# Patient Record
Sex: MALE | Race: WHITE | HISPANIC OR LATINO | URBAN - METROPOLITAN AREA
[De-identification: names, ages, dates, MRNs, and addresses within clinical notes are randomized per-mention and may not be internally consistent; named-entity substitution may affect disease eponyms.]

---

## 2018-09-24 ENCOUNTER — HISTORICAL (OUTPATIENT)
Dept: CARDIOLOGY | Facility: HOSPITAL | Age: 48
End: 2018-09-24

## 2018-11-29 ENCOUNTER — HISTORICAL (OUTPATIENT)
Dept: ADMINISTRATIVE | Facility: HOSPITAL | Age: 48
End: 2018-11-29

## 2018-11-29 ENCOUNTER — HISTORICAL (OUTPATIENT)
Dept: RADIOLOGY | Facility: HOSPITAL | Age: 48
End: 2018-11-29

## 2018-11-29 LAB
ABS NEUT (OLG): 3.7 X10(3)/MCL (ref 2.1–9.2)
ALBUMIN SERPL-MCNC: 4.3 GM/DL (ref 3.4–5)
ALBUMIN/GLOB SERPL: 1 RATIO (ref 1–2)
ALP SERPL-CCNC: 118 UNIT/L (ref 45–117)
ALT SERPL-CCNC: 34 UNIT/L (ref 12–78)
AST SERPL-CCNC: 21 UNIT/L (ref 15–37)
BASOPHILS # BLD AUTO: 0.01 X10(3)/MCL
BASOPHILS NFR BLD AUTO: 0 %
BILIRUB SERPL-MCNC: 0.2 MG/DL (ref 0.2–1)
BILIRUBIN DIRECT+TOT PNL SERPL-MCNC: <0.1 MG/DL
BILIRUBIN DIRECT+TOT PNL SERPL-MCNC: ABNORMAL MG/DL
BUN SERPL-MCNC: 17 MG/DL (ref 7–18)
CALCIUM SERPL-MCNC: 8.8 MG/DL (ref 8.5–10.1)
CHLORIDE SERPL-SCNC: 107 MMOL/L (ref 98–107)
CHOLEST SERPL-MCNC: 183 MG/DL
CHOLEST/HDLC SERPL: 3.8 {RATIO} (ref 0–5)
CO2 SERPL-SCNC: 29 MMOL/L (ref 21–32)
CREAT SERPL-MCNC: 0.9 MG/DL (ref 0.6–1.3)
EOSINOPHIL # BLD AUTO: 0.03 10*3/UL
EOSINOPHIL NFR BLD AUTO: 0 %
ERYTHROCYTE [DISTWIDTH] IN BLOOD BY AUTOMATED COUNT: 13.3 % (ref 11.5–14.5)
GLOBULIN SER-MCNC: 3.5 GM/ML (ref 2.3–3.5)
GLUCOSE SERPL-MCNC: 97 MG/DL (ref 74–106)
HAV IGM SERPL QL IA: NONREACTIVE
HBV CORE IGM SERPL QL IA: NONREACTIVE
HBV SURFACE AG SERPL QL IA: NEGATIVE
HCT VFR BLD AUTO: 44.2 % (ref 40–51)
HCV AB SERPL QL IA: NONREACTIVE
HDLC SERPL-MCNC: 48 MG/DL
HGB BLD-MCNC: 13.8 GM/DL (ref 13.5–17.5)
HIV 1+2 AB+HIV1 P24 AG SERPL QL IA: NONREACTIVE
IMM GRANULOCYTES # BLD AUTO: 0.01 10*3/UL
IMM GRANULOCYTES NFR BLD AUTO: 0 %
LDLC SERPL CALC-MCNC: 110 MG/DL (ref 0–130)
LYMPHOCYTES # BLD AUTO: 1.97 X10(3)/MCL
LYMPHOCYTES NFR BLD AUTO: 32 % (ref 13–40)
MCH RBC QN AUTO: 26.3 PG (ref 26–34)
MCHC RBC AUTO-ENTMCNC: 31.2 GM/DL (ref 31–37)
MCV RBC AUTO: 84.2 FL (ref 80–100)
MONOCYTES # BLD AUTO: 0.43 X10(3)/MCL
MONOCYTES NFR BLD AUTO: 7 % (ref 4–12)
NEUTROPHILS # BLD AUTO: 3.7 X10(3)/MCL
NEUTROPHILS NFR BLD AUTO: 60 X10(3)/MCL
PLATELET # BLD AUTO: 258 X10(3)/MCL (ref 130–400)
PMV BLD AUTO: 9.1 FL (ref 7.4–10.4)
POTASSIUM SERPL-SCNC: 4.4 MMOL/L (ref 3.5–5.1)
PROT SERPL-MCNC: 7.8 GM/DL (ref 6.4–8.2)
RBC # BLD AUTO: 5.25 X10(6)/MCL (ref 4.5–5.9)
SODIUM SERPL-SCNC: 141 MMOL/L (ref 136–145)
T PALLIDUM AB SER QL: NONREACTIVE
TRIGL SERPL-MCNC: 125 MG/DL
TSH SERPL-ACNC: 1.17 MIU/L (ref 0.36–3.74)
VLDLC SERPL CALC-MCNC: 25 MG/DL
WBC # SPEC AUTO: 6.2 X10(3)/MCL (ref 4.5–11)

## 2022-04-11 ENCOUNTER — HISTORICAL (OUTPATIENT)
Dept: ADMINISTRATIVE | Facility: HOSPITAL | Age: 52
End: 2022-04-11

## 2022-04-25 VITALS
BODY MASS INDEX: 29.48 KG/M2 | HEIGHT: 70 IN | SYSTOLIC BLOOD PRESSURE: 112 MMHG | DIASTOLIC BLOOD PRESSURE: 88 MMHG | WEIGHT: 205.94 LBS

## 2024-03-14 ENCOUNTER — LAB VISIT (OUTPATIENT)
Dept: LAB | Facility: HOSPITAL | Age: 54
End: 2024-03-14
Attending: NURSE PRACTITIONER

## 2024-03-14 ENCOUNTER — OFFICE VISIT (OUTPATIENT)
Dept: INTERNAL MEDICINE | Facility: CLINIC | Age: 54
End: 2024-03-14

## 2024-03-14 DIAGNOSIS — Z11.4 SCREENING FOR HIV (HUMAN IMMUNODEFICIENCY VIRUS): ICD-10-CM

## 2024-03-14 DIAGNOSIS — Z12.5 PROSTATE CANCER SCREENING: ICD-10-CM

## 2024-03-14 DIAGNOSIS — Z12.11 COLON CANCER SCREENING: ICD-10-CM

## 2024-03-14 DIAGNOSIS — Z11.3 SCREENING EXAMINATION FOR STD (SEXUALLY TRANSMITTED DISEASE): ICD-10-CM

## 2024-03-14 DIAGNOSIS — Z00.00 WELLNESS EXAMINATION: Primary | ICD-10-CM

## 2024-03-14 DIAGNOSIS — D17.1 LIPOMA OF BACK: ICD-10-CM

## 2024-03-14 LAB
HIV 1+2 AB+HIV1 P24 AG SERPL QL IA: NONREACTIVE
T PALLIDUM AB SER QL: NONREACTIVE

## 2024-03-14 PROCEDURE — 99386 PREV VISIT NEW AGE 40-64: CPT | Mod: S$PBB,,, | Performed by: NURSE PRACTITIONER

## 2024-03-14 PROCEDURE — 36415 COLL VENOUS BLD VENIPUNCTURE: CPT

## 2024-03-14 PROCEDURE — 86780 TREPONEMA PALLIDUM: CPT

## 2024-03-14 PROCEDURE — 99213 OFFICE O/P EST LOW 20 MIN: CPT | Mod: PBBFAC | Performed by: NURSE PRACTITIONER

## 2024-03-14 PROCEDURE — 87389 HIV-1 AG W/HIV-1&-2 AB AG IA: CPT

## 2024-03-14 RX ORDER — AMOXICILLIN AND CLAVULANATE POTASSIUM 500; 125 MG/1; MG/1
1 TABLET, FILM COATED ORAL 3 TIMES DAILY
COMMUNITY
Start: 2024-01-21 | End: 2024-03-14 | Stop reason: ALTCHOICE

## 2024-03-14 RX ORDER — OXYCODONE AND ACETAMINOPHEN 7.5; 325 MG/1; MG/1
1 TABLET ORAL EVERY 6 HOURS PRN
COMMUNITY
Start: 2024-01-21 | End: 2024-03-14 | Stop reason: ALTCHOICE

## 2024-03-14 NOTE — PROGRESS NOTES
LIDIA Estes   [facility]  [address] [address]      PATIENT NAME: Jarocho Martinez  : 1970  DATE: 3/14/24  MRN: [id number]      Patient PCP Information       Provider PCP Type    LIDIA Estes General            Reason for Visit / Chief Complaint: Health Maintenance and Establish Care       History of Present Illness / Problem Focused Workflow     Jarocho Martinez presents to the clinic with Health Maintenance and Establish Care     54 yo , English speaking male here to establish care.     Prostate Cancer Screening - 24 PSA  Colon Cancer Screening -  24 FIT   Osteoporosis Screening - 24 Vitamin D level  STI Screening -   Wellness Screening - 24  Pt here today to establish care, pt is agreeable to routine wellness labs today and we will f/u tomorrow via virtual for lab review. Pt is agreeable to FIT test today for Colon Cancer Screening. Will hold on vaccines due to finances. The pt was instructed to go to [employer] today to apply for financial care however he reports that he is not, is okay with paying [facility]OP for services. 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 Will order soft tissue US and will f/u with results when available and then refer to Gen Surgery for eval. Denies any acute issues today.                 Review of Systems     Review of Systems   Constitutional: Negative.    HENT: Negative.     Eyes: Negative.    Respiratory: Negative.     Cardiovascular: Negative.    Gastrointestinal: Negative.    Endocrine: Negative.    Genitourinary: Negative.    Neurological: Negative.    Psychiatric/Behavioral: Negative.           Medications and Allergies     Medications  Current Outpatient Medications    Medication Instructions    amoxicillin-clavulanate 500-125mg (AUGMENTIN) 500-125 mg Tab 1 tablet, Oral, 3 times daily    oxyCODONE-acetaminophen (PERCOCET) 7.5-325 mg per tablet 1 tablet, Oral, Every 6 hours PRN         Allergies  Review of patient's allergies indicates:  No Known Allergies    Physical Examination     There were no vitals taken for this visit.    Physical Exam  Vitals reviewed.   Constitutional:       Appearance: Normal appearance. He is normal weight.   HENT:      Head: Normocephalic.   Cardiovascular:      Rate and Rhythm: Normal rate and regular rhythm.      Pulses: Normal pulses.      Heart sounds: Normal heart sounds.   Pulmonary:      Effort: Pulmonary effort is normal.      Breath sounds: Normal breath sounds.   Abdominal:      General: Abdomen is flat.      Palpations: Abdomen is soft.   Musculoskeletal:         General: Normal range of motion.      Cervical back: Normal range of motion.   Skin:     General: Skin is warm and dry.          Neurological:      Mental Status: He is alert.   Psychiatric:         Mood and Affect: Mood normal.           Results         Assessment        ICD-10-CM ICD-9-CM   1. Wellness examination  Z00.00 V70.0   2. Lipoma of back  D17.1 214.8   3. Screening for HIV (human immunodeficiency virus)  Z11.4 V73.89   4. Screening examination for STD (sexually transmitted disease)  Z11.3 V74.5   5. Prostate cancer screening  Z12.5 V76.44   6. Colon cancer screening  Z12.11 V76.51        Plan      Problem List Items Addressed This Visit          Oncology    Lipoma of back    Current Assessment & Plan     US Soft Tissue Ordered  Referral to Gen Sx pending          Relevant Orders    US Soft Tissue Lower Back       Other    Wellness examination - Primary    Current Assessment & Plan     Pt wellness visit completed today with appropriate lab work.   FIT test ordered   topics reviewed - $$$          Relevant Orders    Hemoglobin A1C    Vitamin D    Comprehensive Metabolic  Panel    Urinalysis, Reflex to Urine Culture    Lipid Panel    CBC Auto Differential     Other Visit Diagnoses       Screening for HIV (human immunodeficiency virus)        Relevant Orders    HIV 1/2 Ag/Ab (4th Gen)    Screening examination for STD (sexually transmitted disease)        Relevant Orders    Chlamydia/GC, PCR    SYPHILIS ANTIBODY (WITH REFLEX RPR)    Prostate cancer screening        Relevant Orders    PSA, Screening    Colon cancer screening        Relevant Orders    OCCULT BLOOD FECAL IMMUNOASSAY            Future Appointments   Date Time Provider Department Center   3/15/2024  8:00 AM Ludy Kline, [facility]        Follow up in about 1 day (around 3/15/2024) for Established Virtual - Wellness Lab Review.      Signature:     [facility]  [address] [address]    Date of encounter: 3/14/24

## 2024-03-14 NOTE — ASSESSMENT & PLAN NOTE
Pt wellness visit completed today with appropriate lab work.   FIT test ordered  HM topics reviewed - $$$

## 2024-03-15 ENCOUNTER — LAB VISIT (OUTPATIENT)
Dept: LAB | Facility: HOSPITAL | Age: 54
End: 2024-03-15
Attending: NURSE PRACTITIONER

## 2024-03-15 DIAGNOSIS — Z12.5 PROSTATE CANCER SCREENING: ICD-10-CM

## 2024-03-15 DIAGNOSIS — Z00.00 WELLNESS EXAMINATION: ICD-10-CM

## 2024-03-15 LAB
ALBUMIN SERPL-MCNC: 4.5 G/DL (ref 3.5–5)
ALBUMIN/GLOB SERPL: 1.3 RATIO (ref 1.1–2)
ALP SERPL-CCNC: 104 UNIT/L (ref 40–150)
ALT SERPL-CCNC: 18 UNIT/L (ref 0–55)
AST SERPL-CCNC: 16 UNIT/L (ref 5–34)
BASOPHILS # BLD AUTO: 0.01 X10(3)/MCL
BASOPHILS NFR BLD AUTO: 0.2 %
BILIRUB SERPL-MCNC: 0.4 MG/DL
BUN SERPL-MCNC: 19.7 MG/DL (ref 8.4–25.7)
CALCIUM SERPL-MCNC: 10.1 MG/DL (ref 8.4–10.2)
CHLORIDE SERPL-SCNC: 108 MMOL/L (ref 98–107)
CHOLEST SERPL-MCNC: 202 MG/DL
CHOLEST/HDLC SERPL: 4 {RATIO} (ref 0–5)
CO2 SERPL-SCNC: 23 MMOL/L (ref 22–29)
CREAT SERPL-MCNC: 0.98 MG/DL (ref 0.73–1.18)
DEPRECATED CALCIDIOL+CALCIFEROL SERPL-MC: 23 NG/ML (ref 30–80)
EOSINOPHIL # BLD AUTO: 0.03 X10(3)/MCL (ref 0–0.9)
EOSINOPHIL NFR BLD AUTO: 0.5 %
ERYTHROCYTE [DISTWIDTH] IN BLOOD BY AUTOMATED COUNT: 13.5 % (ref 11.5–17)
EST. AVERAGE GLUCOSE BLD GHB EST-MCNC: 116.9 MG/DL
GFR SERPLBLD CREATININE-BSD FMLA CKD-EPI: >60 MLS/MIN/1.73/M2
GLOBULIN SER-MCNC: 3.4 GM/DL (ref 2.4–3.5)
GLUCOSE SERPL-MCNC: 104 MG/DL (ref 74–100)
HBA1C MFR BLD: 5.7 %
HCT VFR BLD AUTO: 45.4 % (ref 42–52)
HDLC SERPL-MCNC: 52 MG/DL (ref 35–60)
HGB BLD-MCNC: 15.1 G/DL (ref 14–18)
IMM GRANULOCYTES # BLD AUTO: 0.02 X10(3)/MCL (ref 0–0.04)
IMM GRANULOCYTES NFR BLD AUTO: 0.3 %
LDLC SERPL CALC-MCNC: 126 MG/DL (ref 50–140)
LYMPHOCYTES # BLD AUTO: 2.23 X10(3)/MCL (ref 0.6–4.6)
LYMPHOCYTES NFR BLD AUTO: 33.6 %
MCH RBC QN AUTO: 27.3 PG (ref 27–31)
MCHC RBC AUTO-ENTMCNC: 33.3 G/DL (ref 33–36)
MCV RBC AUTO: 82.1 FL (ref 80–94)
MONOCYTES # BLD AUTO: 0.36 X10(3)/MCL (ref 0.1–1.3)
MONOCYTES NFR BLD AUTO: 5.4 %
NEUTROPHILS # BLD AUTO: 3.99 X10(3)/MCL (ref 2.1–9.2)
NEUTROPHILS NFR BLD AUTO: 60 %
NRBC BLD AUTO-RTO: 0 %
OB IA INT NEG CNTRL (OHS): NEGATIVE
OB IA INT POS CNTRL (OHS): POSITIVE
OCCULT BLD IA (OHS): POSITIVE
PLATELET # BLD AUTO: 282 X10(3)/MCL (ref 130–400)
PMV BLD AUTO: 8.6 FL (ref 7.4–10.4)
POTASSIUM SERPL-SCNC: 5 MMOL/L (ref 3.5–5.1)
PROT SERPL-MCNC: 7.9 GM/DL (ref 6.4–8.3)
PSA SERPL-MCNC: 0.56 NG/ML
RBC # BLD AUTO: 5.53 X10(6)/MCL (ref 4.7–6.1)
SODIUM SERPL-SCNC: 139 MMOL/L (ref 136–145)
TRIGL SERPL-MCNC: 122 MG/DL (ref 34–140)
VLDLC SERPL CALC-MCNC: 24 MG/DL
WBC # SPEC AUTO: 6.64 X10(3)/MCL (ref 4.5–11.5)

## 2024-03-15 PROCEDURE — 36415 COLL VENOUS BLD VENIPUNCTURE: CPT

## 2024-03-15 PROCEDURE — 84153 ASSAY OF PSA TOTAL: CPT

## 2024-03-15 PROCEDURE — 80061 LIPID PANEL: CPT

## 2024-03-15 PROCEDURE — 82306 VITAMIN D 25 HYDROXY: CPT

## 2024-03-15 PROCEDURE — 80053 COMPREHEN METABOLIC PANEL: CPT

## 2024-03-15 PROCEDURE — 83036 HEMOGLOBIN GLYCOSYLATED A1C: CPT

## 2024-03-15 PROCEDURE — 85025 COMPLETE CBC W/AUTO DIFF WBC: CPT

## 2024-03-21 PROBLEM — R73.03 PREDIABETES: Status: ACTIVE | Noted: 2024-03-21

## 2024-03-21 PROBLEM — E55.9 VITAMIN D DEFICIENCY: Status: ACTIVE | Noted: 2024-03-21

## 2024-03-25 ENCOUNTER — TELEPHONE (OUTPATIENT)
Dept: INTERNAL MEDICINE | Facility: CLINIC | Age: 54
End: 2024-03-25

## 2024-03-25 NOTE — PROGRESS NOTES
Please inform the patient that the FIT (stool test) detected the presence of blood in the stool.  This test does not determine the source of the blood, therefore I am referring them to a GI clinic for further work-up. Our options for Colonoscopy provider's does include the [facility] , however there is a wait time into next year at this time. I  would like to refer the patient to an external GI. I can send the referral to either Dr. Smallwood or Dr. Argueta in [place], Dr. Lacy Arnold, who complete these tests in either [place], or [place], or Dr. Antonio Christopher who go to [place] as well.  Please advise if patient is okay with this and which provider they would like to see.     Give patient the phone number to his office for them to follow up in a few weeks if they do not hear from them.    Dr. Smallwood / Dr. Argueta - [phone]   Dr. Lacy Arnold - [phone]  Dr. Antonio Rios / Dr. Vincent Christopher - [phone]    Also, pt missed his visit with me on 03/22/24 Virtual, will need to reschedule.    Thank you!  LIDIA Centeno

## 2024-03-25 NOTE — TELEPHONE ENCOUNTER
----- Message from LIDIA Estes sent at 3/25/2024  8:06 AM CDT -----  Please inform the patient that the FIT (stool test) detected the presence of blood in the stool.  This test does not determine the source of the blood, therefore I am referring them to a GI clinic for further work-up. Our options for Colonoscopy provider's does include the [facility] , however there is a wait time into next year at this time. I  would like to refer the patient to an external GI. I can send the referral to either Dr. Smallwood or Dr. Argueta in [place], Dr. Lacy Arnold, who complete these tests in either [place], or [place], or Dr. Antonio Christopher who go to [place] as well.  Please advise if patient is okay with this and which provider they would like to see.     Give patient the phone number to his office for them to follow up in a few weeks if they do not hear from them.    Dr. Smallwood / Dr. Argueta - [phone]   Dr. Lacy Arnold - [phone]  Dr. Antonio Rios / Dr. Vincent Christopher - [phone]    Also, pt missed his visit with me on 03/22/24 Virtual, will need to reschedule.    Thank you!  LIDIA Centeno          I attempted to contact patient , but unable to reach . I left detailed message V/M to one number . The other had no V/M

## 2024-03-26 ENCOUNTER — TELEPHONE (OUTPATIENT)
Dept: INTERNAL MEDICINE | Facility: CLINIC | Age: 54
End: 2024-03-26

## 2024-03-26 NOTE — TELEPHONE ENCOUNTER
----- Message from LIDIA Estes sent at 3/25/2024  8:06 AM CDT -----  Please inform the patient that the FIT (stool test) detected the presence of blood in the stool.  This test does not determine the source of the blood, therefore I am referring them to a GI clinic for further work-up. Our options for Colonoscopy provider's does include the [facility] , however there is a wait time into next year at this time. I  would like to refer the patient to an external GI. I can send the referral to either Dr. Smallwood or Dr. Argueta in [place], Dr. Lacy Arnold, who complete these tests in either [place], or [place], or Dr. Antonio Christopher who go to [place] as well.  Please advise if patient is okay with this and which provider they would like to see.     Give patient the phone number to his office for them to follow up in a few weeks if they do not hear from them.    Dr. Smallwood / Dr. Argueta - [phone]   Dr. Lacy Arnold - [phone]  Dr. Antonio Rios / Dr. Vincent Christopher - [phone]    Also, pt missed his visit with me on 03/22/24 Virtual, will need to reschedule.    Thank you!  LIDIA Centeno        I contacted patient and informed message above. He does not have insurance , therefore he has made a F2F appointment reschedule to discuss further his options of where he will go for colonosciopy. He works out of town so appt. Is made first available when he returns.

## 2024-04-11 ENCOUNTER — HOSPITAL ENCOUNTER (EMERGENCY)
Facility: HOSPITAL | Age: 54
Discharge: HOME | End: 2024-04-11
Attending: INTERNAL MEDICINE

## 2024-04-11 ENCOUNTER — OFFICE VISIT (OUTPATIENT)
Dept: INTERNAL MEDICINE | Facility: CLINIC | Age: 54
End: 2024-04-11

## 2024-04-11 VITALS
BODY MASS INDEX: 29.35 KG/M2 | HEART RATE: 73 BPM | SYSTOLIC BLOOD PRESSURE: 137 MMHG | RESPIRATION RATE: 16 BRPM | HEIGHT: 70 IN | TEMPERATURE: 98 F | RESPIRATION RATE: 14 BRPM | SYSTOLIC BLOOD PRESSURE: 126 MMHG | BODY MASS INDEX: 29.35 KG/M2 | HEIGHT: 70 IN | HEART RATE: 73 BPM | DIASTOLIC BLOOD PRESSURE: 95 MMHG | WEIGHT: 205 LBS | DIASTOLIC BLOOD PRESSURE: 76 MMHG | WEIGHT: 205 LBS | OXYGEN SATURATION: 98 % | TEMPERATURE: 98 F

## 2024-04-11 DIAGNOSIS — K59.01 SLOW TRANSIT CONSTIPATION: Primary | ICD-10-CM

## 2024-04-11 DIAGNOSIS — R10.9 ABDOMINAL PAIN: ICD-10-CM

## 2024-04-11 DIAGNOSIS — R19.5 POSITIVE FIT (FECAL IMMUNOCHEMICAL TEST): ICD-10-CM

## 2024-04-11 DIAGNOSIS — R10.32 LEFT LOWER QUADRANT ABDOMINAL PAIN: Primary | ICD-10-CM

## 2024-04-11 DIAGNOSIS — R73.03 PREDIABETES: ICD-10-CM

## 2024-04-11 DIAGNOSIS — Z00.00 WELLNESS EXAMINATION: ICD-10-CM

## 2024-04-11 DIAGNOSIS — E55.9 VITAMIN D DEFICIENCY: ICD-10-CM

## 2024-04-11 DIAGNOSIS — N50.82 SCROTAL PAIN: ICD-10-CM

## 2024-04-11 DIAGNOSIS — Z12.5 PROSTATE CANCER SCREENING: ICD-10-CM

## 2024-04-11 LAB
ALBUMIN SERPL-MCNC: 4.3 G/DL (ref 3.5–5)
ALBUMIN/GLOB SERPL: 1.4 RATIO (ref 1.1–2)
ALP SERPL-CCNC: 107 UNIT/L (ref 40–150)
ALT SERPL-CCNC: 35 UNIT/L (ref 0–55)
APPEARANCE UR: CLEAR
AST SERPL-CCNC: 18 UNIT/L (ref 5–34)
BACTERIA #/AREA URNS AUTO: NORMAL /HPF
BASOPHILS # BLD AUTO: 0.02 X10(3)/MCL
BASOPHILS NFR BLD AUTO: 0.3 %
BILIRUB SERPL-MCNC: 0.2 MG/DL
BILIRUB UR QL STRIP.AUTO: NEGATIVE
BUN SERPL-MCNC: 17.3 MG/DL (ref 8.4–25.7)
CALCIUM SERPL-MCNC: 10 MG/DL (ref 8.4–10.2)
CHLORIDE SERPL-SCNC: 109 MMOL/L (ref 98–107)
CO2 SERPL-SCNC: 26 MMOL/L (ref 22–29)
COLOR UR AUTO: YELLOW
CREAT SERPL-MCNC: 0.85 MG/DL (ref 0.73–1.18)
EOSINOPHIL # BLD AUTO: 0.04 X10(3)/MCL (ref 0–0.9)
EOSINOPHIL NFR BLD AUTO: 0.6 %
ERYTHROCYTE [DISTWIDTH] IN BLOOD BY AUTOMATED COUNT: 13.2 % (ref 11.5–17)
GFR SERPLBLD CREATININE-BSD FMLA CKD-EPI: >60 MLS/MIN/1.73/M2
GLOBULIN SER-MCNC: 3.1 GM/DL (ref 2.4–3.5)
GLUCOSE SERPL-MCNC: 100 MG/DL (ref 74–100)
GLUCOSE UR QL STRIP.AUTO: NEGATIVE
HCT VFR BLD AUTO: 45.1 % (ref 42–52)
HGB BLD-MCNC: 14.6 G/DL (ref 14–18)
HOLD SPECIMEN: NORMAL
HOLD SPECIMEN: NORMAL
IMM GRANULOCYTES # BLD AUTO: 0.02 X10(3)/MCL (ref 0–0.04)
IMM GRANULOCYTES NFR BLD AUTO: 0.3 %
KETONES UR QL STRIP.AUTO: NEGATIVE
LEUKOCYTE ESTERASE UR QL STRIP.AUTO: NEGATIVE
LIPASE SERPL-CCNC: 26 U/L
LYMPHOCYTES # BLD AUTO: 1.93 X10(3)/MCL (ref 0.6–4.6)
LYMPHOCYTES NFR BLD AUTO: 26.5 %
MCH RBC QN AUTO: 27 PG (ref 27–31)
MCHC RBC AUTO-ENTMCNC: 32.4 G/DL (ref 33–36)
MCV RBC AUTO: 83.4 FL (ref 80–94)
MONOCYTES # BLD AUTO: 0.43 X10(3)/MCL (ref 0.1–1.3)
MONOCYTES NFR BLD AUTO: 5.9 %
NEUTROPHILS # BLD AUTO: 4.83 X10(3)/MCL (ref 2.1–9.2)
NEUTROPHILS NFR BLD AUTO: 66.4 %
NITRITE UR QL STRIP.AUTO: NEGATIVE
NRBC BLD AUTO-RTO: 0 %
PH UR STRIP.AUTO: 6 [PH]
PLATELET # BLD AUTO: 272 X10(3)/MCL (ref 130–400)
PMV BLD AUTO: 9.2 FL (ref 7.4–10.4)
POTASSIUM SERPL-SCNC: 5.2 MMOL/L (ref 3.5–5.1)
PROT SERPL-MCNC: 7.4 GM/DL (ref 6.4–8.3)
PROT UR QL STRIP.AUTO: NEGATIVE
RBC # BLD AUTO: 5.41 X10(6)/MCL (ref 4.7–6.1)
RBC #/AREA URNS AUTO: NORMAL /HPF
RBC UR QL AUTO: NEGATIVE
SODIUM SERPL-SCNC: 140 MMOL/L (ref 136–145)
SP GR UR STRIP.AUTO: 1.02 (ref 1–1.03)
SQUAMOUS #/AREA URNS LPF: NORMAL /HPF
UROBILINOGEN UR STRIP-ACNC: NORMAL
WBC # SPEC AUTO: 7.27 X10(3)/MCL (ref 4.5–11.5)
WBC #/AREA URNS AUTO: NORMAL /HPF

## 2024-04-11 PROCEDURE — 83690 ASSAY OF LIPASE: CPT | Performed by: NURSE PRACTITIONER

## 2024-04-11 PROCEDURE — 93005 ELECTROCARDIOGRAM TRACING: CPT

## 2024-04-11 PROCEDURE — 80053 COMPREHEN METABOLIC PANEL: CPT | Performed by: NURSE PRACTITIONER

## 2024-04-11 PROCEDURE — 99215 OFFICE O/P EST HI 40 MIN: CPT | Mod: S$PBB,,, | Performed by: NURSE PRACTITIONER

## 2024-04-11 PROCEDURE — 85025 COMPLETE CBC W/AUTO DIFF WBC: CPT | Performed by: NURSE PRACTITIONER

## 2024-04-11 PROCEDURE — 99285 EMERGENCY DEPT VISIT HI MDM: CPT | Mod: 25,27

## 2024-04-11 PROCEDURE — 81001 URINALYSIS AUTO W/SCOPE: CPT | Performed by: NURSE PRACTITIONER

## 2024-04-11 PROCEDURE — 99214 OFFICE O/P EST MOD 30 MIN: CPT | Mod: PBBFAC,25 | Performed by: NURSE PRACTITIONER

## 2024-04-11 RX ORDER — DOCUSATE SODIUM 100 MG/1
100 CAPSULE, LIQUID FILLED ORAL 2 TIMES DAILY PRN
Qty: 20 CAPSULE | Refills: 0 | Status: SHIPPED | OUTPATIENT
Start: 2024-04-11

## 2024-04-11 NOTE — ED PROVIDER NOTES
Encounter Date: 4/11/2024       History     Chief Complaint   Patient presents with    Abdominal Pain    Flank Pain     Patient reports 3 week onset of LUQ pain radiating to the left flank. Denies dysuria but reports occasional testicular pain. No nausea/vomiting.     Presents with LLQ abdominal discomfort for 3 weeks. Denies chronic medical problems or taking medications. Denies fever, bloody stools, dysuria, hematuria or rash. States some BM problems fluctuating between diarrhea and constipation. States colonoscopy several years ago with benign polyps, never F/U. Sx Hx; Cholecystectomy and appendectomy    The history is provided by the patient.     Review of patient's allergies indicates:  No Known Allergies  History reviewed. No pertinent past medical history.  History reviewed. No pertinent surgical history.  Family History   Problem Relation Age of Onset    Hypertension Mother     Diabetes Mother     Heart disease Father      Social History     Tobacco Use    Smoking status: Never    Smokeless tobacco: Never   Substance Use Topics    Alcohol use: Yes     Alcohol/week: 21.0 standard drinks of alcohol     Types: 21 Cans of beer per week    Drug use: Never     Review of Systems   Gastrointestinal:  Positive for abdominal pain, constipation and diarrhea.       Physical Exam     Initial Vitals [04/11/24 0750]   BP Pulse Resp Temp SpO2   (!) 137/95 73 14 98.1 °F (36.7 °C) 98 %      MAP       --         Physical Exam    Nursing note and vitals reviewed.  Constitutional: He appears well-developed and well-nourished. No distress.   HENT:   Head: Normocephalic and atraumatic.   Eyes: Conjunctivae are normal. Pupils are equal, round, and reactive to light.   Neck: Neck supple.   Normal range of motion.  Cardiovascular:  Normal rate, regular rhythm, normal heart sounds and intact distal pulses.           Pulmonary/Chest: Breath sounds normal. No respiratory distress.   Abdominal: Abdomen is soft. Bowel sounds are normal.  He exhibits no distension. There is no abdominal tenderness. There is no rebound and no guarding.   Musculoskeletal:         General: No edema. Normal range of motion.      Cervical back: Normal range of motion and neck supple.     Neurological: He is alert and oriented to person, place, and time. He has normal strength. GCS score is 15. GCS eye subscore is 4. GCS verbal subscore is 5. GCS motor subscore is 6.   Skin: Skin is warm and dry. No rash noted.   Psychiatric: His behavior is normal.         ED Course   Procedures  Labs Reviewed   COMPREHENSIVE METABOLIC PANEL - Abnormal; Notable for the following components:       Result Value    Potassium Level 5.2 (*)     Chloride 109 (*)     All other components within normal limits   CBC WITH DIFFERENTIAL - Abnormal; Notable for the following components:    MCHC 32.4 (*)     All other components within normal limits   LIPASE - Normal   CBC W/ AUTO DIFFERENTIAL    Narrative:     The following orders were created for panel order CBC auto differential.  Procedure                               Abnormality         Status                     ---------                               -----------         ------                     CBC with Differential[[id number]]       Abnormal            Final result                 Please view results for these tests on the individual orders.   URINALYSIS, REFLEX TO URINE CULTURE   EXTRA TUBES    Narrative:     The following orders were created for panel order EXTRA TUBES.  Procedure                               Abnormality         Status                     ---------                               -----------         ------                     Light Blue Top Hold[[id number]]                             In process                 Gold Top Hold[[id number]]                                   In process                   Please view results for these tests on the individual orders.   LIGHT BLUE TOP HOLD   GOLD TOP HOLD     EKG Readings:  (Independently Interpreted)   Initial Reading: No STEMI. Rhythm: Normal Sinus Rhythm. Heart Rate: 76. Ectopy: No Ectopy. Conduction: Normal. ST Segments: Normal ST Segments. T Waves: Normal. Axis: Normal. Clinical Impression: Normal Sinus Rhythm     ECG Results              EKG 12-lead (In process)        Collection Time Result Time QRS Duration OHS QTC Calculation    04/11/24 07:56:53 04/11/24 08:32:22 80 407                     In process by Interface, Lab In [facility] (04/11/24 08:32:30)                   Narrative:    Test Reason : R10.9,    Vent. Rate : 076 BPM     Atrial Rate : 076 BPM     P-R Int : 178 ms          QRS Dur : 080 ms      QT Int : 362 ms       P-R-T Axes : 033 032 036 degrees     QTc Int : 407 ms    Normal sinus rhythm  Normal ECG  No previous ECGs available    Referred By: [email or web address]   SELF           Confirmed By:                                   Imaging Results              X-Ray Abdomen Flat And Erect (Final result)  Result time 04/11/24 09:17:24      Final result by Hakan Guy MD (04/11/24 09:17:24)                   Impression:      No acute process is identified..    Residual feces      Electronically signed by: Hakan Guy  Date:    04/11/2024  Time:    09:17               Narrative:    EXAMINATION:  XR ABDOMEN FLAT AND ERECT    CLINICAL HISTORY:  , Unspecified abdominal pain.    FINDINGS:  The bowel gas pattern is nonspecific, and no free air or pneumatosis is seen. There is no evidence for organomegaly. No abnormal calcifications are seen. The visualized osseous structures are unremarkable..    Some residual feces identified throughout the colon                                       Medications - No data to display  Medical Decision Making  Amount and/or Complexity of Data Reviewed  Labs: ordered. Decision-making details documented in ED Course.  Radiology: ordered and independent interpretation performed. Decision-making details documented in ED Course.  ECG/medicine  tests: ordered and independent interpretation performed. Decision-making details documented in ED Course.      Additional MDM:   Differential Diagnosis:   Appendicitis, Diverticulitis, Pancreatitis, Pyelonephritis, AAA, Dissection, MI, Gastric Ulcer, Peptic Ulcer, Urinary retention, among others                                        Clinical Impression:  Final diagnoses:  [R10.9] Abdominal pain  [K59.01] Slow transit constipation (Primary)          ED Disposition Condition    Discharge Stable          ED Prescriptions       Medication Sig Dispense Start Date End Date Auth. Provider    docusate sodium (COLACE) 100 MG capsule Take 1 capsule (100 mg total) by mouth 2 (two) times daily as needed for Constipation. 20 capsule 4/11/2024 -- Masoud Covington MD          Follow-up Information       Follow up With Specialties Details Why Contact Info    Ludy Kline FNP Nurse Practitioner In 2 weeks  [address] [facility] [address]  [phone]      [facility] Emergency Medicine  If symptoms worsen [address] [facility] [address] [phone]             Masoud Covington MD  04/11/24 0950

## 2024-04-11 NOTE — ASSESSMENT & PLAN NOTE
Follow ADA diet.  Avoid soda, simple sweets, and limit rice/pasta/bread/starches and consume brown options when possible.   Maintain healthy weight with BMI goal <30.   Perform aerobic exercise for 150 minutes per week (or 5 days a week for 30 minutes each day).   Examine feet daily.     Lab Results   Component Value Date    HGBA1C 5.7 03/15/2024

## 2024-04-11 NOTE — PROGRESS NOTES
Ludy Kline, LIDIA   [facility]  [address] [facility] [address]      PATIENT NAME: Jarocho Martinez  : 1970  DATE: 24  MRN: [id number]      Reason for Visit / Chief Complaint: Health Maintenance (Lab review / positive FIT)       History of Present Illness / Problem Focused Workflow     Jarocho Martinez presents to the clinic with Health Maintenance (Lab review / positive FIT)     54 yo , English speaking male here to establish care.     Prostate Cancer Screening - 24 PSA 0.56  Colon Cancer Screening -  24 FIT   Osteoporosis Screening - 24 Vitamin D level 23  STI Screening -   Wellness Screening - 24  Pt here today to establish care, pt is agreeable to routine wellness labs today and we will f/u tomorrow via virtual for lab review. Pt is agreeable to FIT test today for Colon Cancer Screening. Will hold on vaccines due to finances. The pt was instructed to go to [place] [id number] today to apply for financial care however he reports that he is not, is okay with paying [facility]OP for services. 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 Will order soft tissue US and will f/u with results when available and then refer to Gen Surgery for eval. Denies any acute issues today.     24  Pt here today for f/u for lab review;   US Soft tissue is scheduled for next week, will f/u when results are available. Pt reports with pain in his LLQ that radiates to his back x 3 weeks: the pt went to ER in [place] on 24  for chest pain; completed exams include labs, EKG, Chest X-ray, and limited Abdomen US; will request records today to evaluate. The pt reports his pain is constant, feels worse at times; he also reports with some scrotal / testicle  pain that comes and goes as well; pt reports he was dx with GERD in [place] but has not started any medications that were recommended to him. discussed with pt US orders, pt is requesting they be completed today, discussed with pt calling scheduling to see soonest appointment times but unsure if they can be completed today. The pt reports he would rather go to the ED today for evaluation as he is very concerned with his pain. Pt escorted to ED via wheelchair by nurse, report given to Bry in ED. Will f/u with eval and make f/u appt for patient.           Review of Systems     Review of Systems   Constitutional: Negative.    HENT: Negative.     Eyes: Negative.    Respiratory: Negative.     Cardiovascular: Negative.    Gastrointestinal:  Positive for abdominal pain.   Endocrine: Negative.    Genitourinary:  Positive for testicular pain.   Neurological: Negative.    Psychiatric/Behavioral: Negative.           Medications and Allergies     Medications        Allergies  Review of patient's allergies indicates:  No Known Allergies    Physical Examination     Vitals:    04/11/24 0711   BP: 126/76   Pulse: 73   Resp: 16   Temp: 98.1 °F (36.7 °C)     Physical Exam  Vitals reviewed.   Constitutional:       Appearance: Normal appearance. He is normal weight.   HENT:      Head: Normocephalic.   Cardiovascular:      Rate and Rhythm: Normal rate and regular rhythm.      Pulses: Normal pulses.      Heart sounds: Normal heart sounds.   Pulmonary:      Effort: Pulmonary effort is normal.      Breath sounds: Normal breath sounds.   Abdominal:      General: Abdomen is flat.      Palpations: Abdomen is soft.      Tenderness: There is abdominal tenderness in the left lower quadrant. There is no right CVA tenderness, left CVA tenderness, guarding or rebound.          Comments: Negative Bilateral CVA Tenderness    Musculoskeletal:         General: Normal range of motion.      Cervical back: Normal range of motion.   Skin:     General: Skin  "is warm and dry.   Neurological:      Mental Status: He is alert.   Psychiatric:         Mood and Affect: Mood normal.           Results     Lab Results   Component Value Date    WBC 6.64 03/15/2024    RBC 5.53 03/15/2024    HGB 15.1 03/15/2024    HCT 45.4 03/15/2024    MCV 82.1 03/15/2024    MCH 27.3 03/15/2024    MCHC 33.3 03/15/2024    RDW 13.5 03/15/2024     03/15/2024    MPV 8.6 03/15/2024     Sodium Level   Date Value Ref Range Status   03/15/2024 139 136 - 145 mmol/L Final     Potassium Level   Date Value Ref Range Status   03/15/2024 5.0 3.5 - 5.1 mmol/L Final     Carbon Dioxide   Date Value Ref Range Status   03/15/2024 23 22 - 29 mmol/L Final     Blood Urea Nitrogen   Date Value Ref Range Status   03/15/2024 19.7 8.4 - 25.7 mg/dL Final     Creatinine   Date Value Ref Range Status   03/15/2024 0.98 0.73 - 1.18 mg/dL Final     Calcium Level Total   Date Value Ref Range Status   03/15/2024 10.1 8.4 - 10.2 mg/dL Final     Albumin Level   Date Value Ref Range Status   03/15/2024 4.5 3.5 - 5.0 g/dL Final     Bilirubin Total   Date Value Ref Range Status   03/15/2024 0.4 <=1.5 mg/dL Final     Alkaline Phosphatase   Date Value Ref Range Status   03/15/2024 104 40 - 150 unit/L Final     Aspartate Aminotransferase   Date Value Ref Range Status   03/15/2024 16 5 - 34 unit/L Final     Alanine Aminotransferase   Date Value Ref Range Status   03/15/2024 18 0 - 55 unit/L Final     Estimated GFR-Non    Date Value Ref Range Status   11/29/2018 96 >>=90 mL/min Final     Lab Results   Component Value Date    CHOL 202 (H) 03/15/2024     Lab Results   Component Value Date    HDL 52 03/15/2024     Lab Results   Component Value Date    TRIG 122 03/15/2024     Lab Results   Component Value Date    VLDL 24 03/15/2024     Lab Results   Component Value Date    .00 03/15/2024     Lab Results   Component Value Date    TSH 1.170 11/29/2018     No results found for: "PHUR", "SPECGRAV", "PROTEINUA", " ""GLUCUA", "KETONESU", "OCCULTUA", "NITRITE", "LEUKOCYTESUR"  Lab Results   Component Value Date    HGBA1C 5.7 03/15/2024           Assessment         ICD-10-CM ICD-9-CM   1. Left lower quadrant abdominal pain  R10.32 789.04   2. Scrotal pain  N50.82 608.9   3. Vitamin D deficiency  E55.9 268.9   4. Wellness examination  Z00.00 V70.0   5. Prostate cancer screening  Z12.5 V76.44   6. Prediabetes  R73.03 790.29   7. Positive FIT (fecal immunochemical test)  R19.5 792.1       Plan      Problem List Items Addressed This Visit          Renal/    Scrotal pain    Current Assessment & Plan     ED Request  F/U with results          Relevant Orders    US Scrotum And Testicles       Endocrine    Prediabetes    Current Assessment & Plan     Follow ADA diet.  Avoid soda, simple sweets, and limit rice/pasta/bread/starches and consume brown options when possible.   Maintain healthy weight with BMI goal <30.   Perform aerobic exercise for 150 minutes per week (or 5 days a week for 30 minutes each day).   Examine feet daily.     Lab Results   Component Value Date    HGBA1C 5.7 03/15/2024            Vitamin D deficiency       GI    Left lower quadrant abdominal pain - Primary    Current Assessment & Plan     ED Request  F/U with results          Relevant Orders    US Abdomen Complete       Other    Wellness examination    Relevant Orders    TSH    Hemoglobin A1C    Vitamin D    Comprehensive Metabolic Panel    Urinalysis, Reflex to Urine Culture    Lipid Panel    CBC Auto Differential     Other Visit Diagnoses       Prostate cancer screening        Relevant Orders    PSA, Screening    Positive FIT (fecal immunochemical test)        Relevant Orders    Ambulatory referral/consult to Endo Procedure             Future Appointments   Date Time Provider Department Center   4/18/2024 10:00 AM [address] [place]            Signature:     [facility]  [address] [place] [address]    Date of encounter: 4/11/24

## 2024-04-12 LAB
OHS QRS DURATION: 80 MS
OHS QTC CALCULATION: 407 MS

## 2024-04-15 ENCOUNTER — PATIENT MESSAGE (OUTPATIENT)
Dept: INTERNAL MEDICINE | Facility: CLINIC | Age: 54
End: 2024-04-15

## 2024-04-26 DIAGNOSIS — Z12.11 COLON CANCER SCREENING: Primary | ICD-10-CM

## 2024-04-26 DIAGNOSIS — R10.32 LEFT LOWER QUADRANT ABDOMINAL PAIN: ICD-10-CM

## 2024-04-26 RX ORDER — POLYETHYLENE GLYCOL 3350, SODIUM SULFATE, SODIUM CHLORIDE, POTASSIUM CHLORIDE, SODIUM ASCORBATE, AND ASCORBIC ACID 7.5-2.691G
KIT ORAL
Qty: 1 KIT | Refills: 0 | Status: SHIPPED | OUTPATIENT
Start: 2024-04-26 | End: 2024-05-28

## 2024-05-01 ENCOUNTER — ANESTHESIA EVENT (OUTPATIENT)
Dept: ENDOSCOPY | Facility: HOSPITAL | Age: 54
End: 2024-05-01

## 2024-05-01 NOTE — ANESTHESIA PREPROCEDURE EVALUATION
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      Vitals:    05/02/24 0737   BP: (!) 143/83   Pulse: 79   Resp: 19   Temp: 36.4 °C (97.5 °F)   SpO2: 97%   Weight: 90.6 kg (199 lb 12.8 oz)   Height: 5' 10" (1.778 m)         Active Ambulatory Problems     Diagnosis Date Noted    Wellness examination 03/14/2024    Lipoma of back 03/14/2024    Prediabetes 03/21/2024    Vitamin D deficiency 03/21/2024    Left lower quadrant abdominal pain 04/11/2024    Scrotal pain 04/11/2024     Resolved Ambulatory Problems     Diagnosis Date Noted    No Resolved Ambulatory Problems     No Additional Past Medical History       No past surgical history on file.      No current facility-administered medications on file prior to encounter.     Current Outpatient Medications on File Prior to Encounter   Medication Sig Dispense Refill    docusate sodium (COLACE) 100 MG capsule Take 1 capsule (100 mg total) by mouth 2 (two) times daily as needed for Constipation. 20 capsule 0       Lab Results   Component Value Date    WBC 7.27 04/11/2024    HGB 14.6 04/11/2024    HCT 45.1 04/11/2024     04/11/2024    CHOL 202 (H) 03/15/2024    TRIG 122 03/15/2024    HDL 52 03/15/2024    ALT 35 04/11/2024    AST 18 04/11/2024     04/11/2024    K 5.2 (H) 04/11/2024    CREATININE 0.85 04/11/2024    BUN 17.3 04/11/2024    CO2 26 04/11/2024    TSH 1.170 11/29/2018    PSA 0.56 03/15/2024    HGBA1C 5.7 03/15/2024           Pre-op Assessment    I have reviewed the Patient Summary Reports.     I have reviewed the Nursing Notes. I have reviewed the NPO Status.   I have reviewed the Medications.     Review of Systems  Anesthesia Hx:  No problems with previous Anesthesia   History of prior surgery of interest to airway management or planning:          Denies Family Hx of Anesthesia " complications.    Denies Personal Hx of Anesthesia complications.                    Hematology/Oncology:  Hematology Normal   Oncology Normal                                   EENT/Dental:  EENT/Dental Normal           Cardiovascular:  Cardiovascular Normal                                            Pulmonary:  Pulmonary Normal                       Renal/:  Renal/ Normal                 Hepatic/GI:  Hepatic/GI Normal                 Musculoskeletal:  Musculoskeletal Normal                Neurological:  Neurology Normal                                      Endocrine:  Endocrine Normal            Dermatological:  Skin Normal    Psych:  Psychiatric Normal                    Physical Exam  General: Well nourished, Cooperative, Alert and Oriented    Airway:  Mallampati: I / I  Mouth Opening: Normal  TM Distance: Normal  Tongue: Normal  Neck ROM: Normal ROM    Dental:  Intact        Anesthesia Plan  Type of Anesthesia, risks & benefits discussed:    Anesthesia Type: Gen Natural Airway  Intra-op Monitoring Plan: Standard ASA Monitors  Post Op Pain Control Plan: IV/PO Opioids PRN  (medical reason for not using multimodal pain management)  Induction:  IV  Informed Consent: Informed consent signed with the Patient and all parties understand the risks and agree with anesthesia plan.  All questions answered. Patient consented to blood products? No  ASA Score: 1  Day of Surgery Review of History & Physical: H&P Update referred to the surgeon/provider.    Ready For Surgery From Anesthesia Perspective.     .

## 2024-05-02 ENCOUNTER — ANESTHESIA (OUTPATIENT)
Dept: ENDOSCOPY | Facility: HOSPITAL | Age: 54
End: 2024-05-02

## 2024-05-02 ENCOUNTER — HOSPITAL ENCOUNTER (OUTPATIENT)
Facility: HOSPITAL | Age: 54
Discharge: HOME | End: 2024-05-02
Attending: INTERNAL MEDICINE | Admitting: INTERNAL MEDICINE

## 2024-05-02 VITALS
BODY MASS INDEX: 28.6 KG/M2 | HEART RATE: 63 BPM | DIASTOLIC BLOOD PRESSURE: 68 MMHG | RESPIRATION RATE: 18 BRPM | TEMPERATURE: 98 F | WEIGHT: 199.81 LBS | HEIGHT: 70 IN | OXYGEN SATURATION: 99 % | SYSTOLIC BLOOD PRESSURE: 108 MMHG

## 2024-05-02 DIAGNOSIS — D12.2 ADENOMATOUS POLYP OF ASCENDING COLON: Primary | ICD-10-CM

## 2024-05-02 DIAGNOSIS — R19.5 POSITIVE FIT (FECAL IMMUNOCHEMICAL TEST): ICD-10-CM

## 2024-05-02 PROCEDURE — 63600175 PHARM REV CODE 636 W HCPCS: Performed by: NURSE ANESTHETIST, CERTIFIED REGISTERED

## 2024-05-02 PROCEDURE — 88305 TISSUE EXAM BY PATHOLOGIST: CPT | Mod: TC | Performed by: INTERNAL MEDICINE

## 2024-05-02 PROCEDURE — 37000009 HC ANESTHESIA EA ADD 15 MINS: Performed by: INTERNAL MEDICINE

## 2024-05-02 PROCEDURE — 25000003 PHARM REV CODE 250: Performed by: NURSE ANESTHETIST, CERTIFIED REGISTERED

## 2024-05-02 PROCEDURE — 37000008 HC ANESTHESIA 1ST 15 MINUTES: Performed by: INTERNAL MEDICINE

## 2024-05-02 PROCEDURE — 27201423 OPTIME MED/SURG SUP & DEVICES STERILE SUPPLY: Performed by: INTERNAL MEDICINE

## 2024-05-02 PROCEDURE — 45385 COLONOSCOPY W/LESION REMOVAL: CPT | Mod: PT | Performed by: INTERNAL MEDICINE

## 2024-05-02 PROCEDURE — 45385 COLONOSCOPY W/LESION REMOVAL: CPT | Mod: PT,,, | Performed by: INTERNAL MEDICINE

## 2024-05-02 PROCEDURE — 63600175 PHARM REV CODE 636 W HCPCS: Performed by: SPECIALIST

## 2024-05-02 PROCEDURE — D9220A PRA ANESTHESIA: Mod: ,,, | Performed by: NURSE ANESTHETIST, CERTIFIED REGISTERED

## 2024-05-02 RX ORDER — PROPOFOL 10 MG/ML
INJECTION, EMULSION INTRAVENOUS
Status: DISCONTINUED | OUTPATIENT
Start: 2024-05-02 | End: 2024-05-02

## 2024-05-02 RX ORDER — SODIUM CHLORIDE, SODIUM LACTATE, POTASSIUM CHLORIDE, CALCIUM CHLORIDE 600; 310; 30; 20 MG/100ML; MG/100ML; MG/100ML; MG/100ML
INJECTION, SOLUTION INTRAVENOUS CONTINUOUS
Status: DISCONTINUED | OUTPATIENT
Start: 2024-05-02 | End: 2024-05-02 | Stop reason: HOSPADM

## 2024-05-02 RX ORDER — LIDOCAINE HYDROCHLORIDE 20 MG/ML
INJECTION INTRAVENOUS
Status: DISCONTINUED | OUTPATIENT
Start: 2024-05-02 | End: 2024-05-02

## 2024-05-02 RX ADMIN — PROPOFOL 25 MG: 10 INJECTION, EMULSION INTRAVENOUS at 08:05

## 2024-05-02 RX ADMIN — PROPOFOL 50 MG: 10 INJECTION, EMULSION INTRAVENOUS at 08:05

## 2024-05-02 RX ADMIN — SODIUM CHLORIDE, POTASSIUM CHLORIDE, SODIUM LACTATE AND CALCIUM CHLORIDE: 600; 310; 30; 20 INJECTION, SOLUTION INTRAVENOUS at 07:05

## 2024-05-02 RX ADMIN — PROPOFOL 100 MG: 10 INJECTION, EMULSION INTRAVENOUS at 08:05

## 2024-05-02 RX ADMIN — LIDOCAINE HYDROCHLORIDE 50 MG: 20 INJECTION INTRAVENOUS at 08:05

## 2024-05-02 RX ADMIN — PROPOFOL 25 MG: 10 INJECTION, EMULSION INTRAVENOUS at 09:05

## 2024-05-02 NOTE — PROVATION PATIENT INSTRUCTIONS
Discharge Summary/Instructions after an Endoscopic Procedure  Patient Name: Jarocho Martinez  Patient MRN: [id number]  Patient YOB: 1970  Thursday, May 2, 2024  Antonio Soto MD  Dear patient,  As a result of recent federal legislation (The Federal Cures Act), you may   receive lab or pathology results from your procedure in your [facility]   account before your physician is able to contact you. Your physician or   their representative will relay the results to you with their   recommendations at their soonest availability.  Thank you,  RESTRICTIONS:  During your procedure today, you received medications for sedation.  These   medications may affect your judgment, balance and coordination.  Therefore,   for 24 hours, you have the following restrictions:   - DO NOT drive a car, operate machinery, make legal/financial decisions,   sign important papers or drink alcohol.    ACTIVITY:  Today: no heavy lifting, straining or running due to procedural   sedation/anesthesia.  The following day: return to full activity including work.  DIET:  Eat and drink normally unless instructed otherwise.     TREATMENT FOR COMMON SIDE EFFECTS:  - Mild abdominal pain, nausea, belching, bloating or excessive gas:  rest,   eat lightly and use a heating pad.  - Sore Throat: treat with throat lozenges and/or gargle with warm salt   water.  - Because air was used during the procedure, expelling large amounts of air   from your rectum or belching is normal.  - If a bowel prep was taken, you may not have a bowel movement for 1-3 days.    This is normal.  SYMPTOMS TO WATCH FOR AND REPORT TO YOUR PHYSICIAN:  1. Abdominal pain or bloating, other than gas cramps.  2. Chest pain.  3. Back pain.  4. Signs of infection such as: chills or fever occurring within 24 hours   after the procedure.  5. Rectal bleeding, which would show as bright red, maroon, or black stools.   (A tablespoon of blood from the rectum is not serious, especially  if   hemorrhoids are present.)  6. Vomiting.  7. Weakness or dizziness.  GO DIRECTLY TO THE NEAREST EMERGENCY ROOM IF YOU HAVE ANY OF THE FOLLOWING:      Difficulty breathing              Chills and/or fever over 101 F   Persistent vomiting and/or vomiting blood   Severe abdominal pain   Severe chest pain   Black, tarry stools   Bleeding- more than one tablespoon   Any other symptom or condition that you feel may need urgent attention  Your doctor recommends these additional instructions:  If any biopsies were taken, your doctors clinic will contact you in 1 to 2   weeks with any results.  - Discharge patient to home (ambulatory).   - Resume previous diet today.   - Repeat colonoscopy in 5 years for surveillance.  For questions, problems or results please call your physician - Antonio Soto MD at Work:  [phone].  [facility] , EMERGENCY ROOM PHONE NUMBER: [phone]  IF A COMPLICATION OR EMERGENCY SITUATION ARISES AND YOU ARE UNABLE TO REACH   YOUR PHYSICIAN - GO DIRECTLY TO THE EMERGENCY ROOM.  MD Antonio Boyd MD  5/2/2024 9:13:49 AM  This report has been verified and signed electronically.  Dear patient,  As a result of recent federal legislation (The Federal Cures Act), you may   receive lab or pathology results from your procedure in your [facility]   account before your physician is able to contact you. Your physician or   their representative will relay the results to you with their   recommendations at their soonest availability.  Thank you,  PROVATION

## 2024-05-02 NOTE — H&P
Colonoscopy History and Physical    Patient Name: Jarocho Martinez  MRN: [id number]  : 1970  Date of Procedure:  2024  Referring Physician: Antonio Soto MD  Primary Physician: Ludy Klien FNP  Procedure Physician: Antonio Soto MD    Procedure - Colonoscopy  ASA - per anesthesia  Mallampati - per anesthesia  History of Anesthesia problems - no  Family history Anesthesia problems -  no   Plan of anesthesia - General    Diagnosis: previous adenomatous polyp  Chief Complaint: Same as above    HPI: Patient is an 53 y.o. male is here for the above. Patient was states that he had a colonoscopy done 14 or 15 years ago that was benign.  At the present time he is having alternating constipation and diarrhea and occasionally bright red blood per rectum.  He apparently was supposed to have a fit test done but I can not find those results.  He has had no change in appetite or weight loss.  He denies family history of colon polyps or colon cancer.    Last colonoscopy:   Years ago   Family history:  no  Anticoagulation:  no    ROS:  Constitutional: No fevers, chills, No weight loss  CV: No chest pain  Pulm: No cough, No shortness of breath  GI: see HPI    Medical History:   No past medical history on file.      Surgical History:   Past Surgical History:   Procedure Laterality Date    APPENDECTOMY      CHOLECYSTECTOMY         Family History:   Family History   Problem Relation Name Age of Onset    Hypertension Mother Jami Martinez     Diabetes Mother Jami Martinez     Heart disease Father Louis Nichols    .    Social History:   Social History     Socioeconomic History    Marital status:    Occupational History    Occupation: construction   Tobacco Use    Smoking status: Never    Smokeless tobacco: Never   Substance and Sexual Activity    Alcohol use: Yes     Alcohol/week: 21.0 standard drinks of alcohol     Types: 21 Cans of beer per week    Drug use: Never    Sexual activity: Yes     Birth  "control/protection: Condom     Social Determinants of Health     Financial Resource Strain: Low Risk  (3/14/2024)    Overall Financial Resource Strain (CARDIA)     Difficulty of Paying Living Expenses: Not hard at all   Food Insecurity: No Food Insecurity (3/14/2024)    Hunger Vital Sign     Worried About Running Out of Food in the Last Year: Never true     Ran Out of Food in the Last Year: Never true   Transportation Needs: No Transportation Needs (3/14/2024)    PRAPARE - Transportation     Lack of Transportation (Medical): No     Lack of Transportation (Non-Medical): No   Recent Concern: Transportation Needs - Unmet Transportation Needs (3/14/2024)    PRAPARE - Transportation     Lack of Transportation (Medical): Yes     Lack of Transportation (Non-Medical): Yes   Physical Activity: Inactive (3/14/2024)    Exercise Vital Sign     Days of Exercise per Week: 0 days     Minutes of Exercise per Session: 0 min   Stress: No Stress Concern Present (3/14/2024)    [employer] [place] [employer] - Occupational Stress Questionnaire     Feeling of Stress : Not at all   Housing Stability: Low Risk  (3/14/2024)    Housing Stability Vital Sign     Unable to Pay for Housing in the Last Year: No     Number of Places Lived in the Last Year: 1     Unstable Housing in the Last Year: No       Review of patient's allergies indicates:  No Known Allergies    Medications:   Medications Prior to Admission   Medication Sig Dispense Refill Last Dose    docusate sodium (COLACE) 100 MG capsule Take 1 capsule (100 mg total) by mouth 2 (two) times daily as needed for Constipation. 20 capsule 0 Taking    polyethylene glycol (MOVIPREP) 100-7.5-2.691 gram solution Take as directed prior to colonoscopy 1 kit 0 Taking         Physical Exam:    Vital Signs:   Vitals:    05/02/24 0737   BP: (!) 143/83   Pulse: 79   Resp: 19   Temp: 97.5 °F (36.4 °C)     BP (!) 143/83   Pulse 79   Temp 97.5 °F (36.4 °C)   Resp 19   Ht 5' 10" (1.778 m)   " "Wt 90.6 kg (199 lb 12.8 oz)   SpO2 97%   BMI 28.67 kg/m²     General:          Well appearing in no acute distress  Lungs: Clear to auscultation bilaterally, respirations unlabored  Heart: Regular rate and rhythm, S1 and S2 normal, no obvious murmurs  Abdomen:         Soft, non-tender, bowel sounds normal, no masses, no organomegaly        Labs:  Lab Results   Component Value Date    WBC 7.27 04/11/2024    HGB 14.6 04/11/2024    HCT 45.1 04/11/2024    MCV 83.4 04/11/2024     04/11/2024     No results found for: "INR", "PT", "APTT"  Lab Results   Component Value Date     04/11/2024    K 5.2 (H) 04/11/2024    CO2 26 04/11/2024    BUN 17.3 04/11/2024    CREATININE 0.85 04/11/2024    LABPROT 7.4 04/11/2024    ALBUMIN 4.3 04/11/2024    BILITOT 0.2 04/11/2024    ALKPHOS 107 04/11/2024    ALT 35 04/11/2024    AST 18 04/11/2024         Assessment and Plan:    History reviewed, vital signs satisfactory, cardiopulmonary status satisfactory.  I have explained the sedation options, risks, benefits, and alternatives of this endoscopic procedure to the patient including but not limited to bleeding, inflammation, infection, perforation, and death.  All questions were answered and the patient consented to proceed with procedure as planned.   The patient is deemed an appropriate candidate for the sedation as planned.      Antonio Soto MD   of Clinical Medicine  Gastroenterology and Hepatology  [facility]    5/2/2024  8:29 AM     "

## 2024-05-02 NOTE — TRANSFER OF CARE
Anesthesia Transfer of Care Note    Patient: Jarocho Martinez    Procedure(s) Performed: Procedure(s) (LRB):  COLONOSCOPY (N/A)    Patient location: GI    Anesthesia Type: general    Post pain: adequate analgesia    Post assessment: no apparent anesthetic complications    Post vital signs: stable    Level of consciousness: sedated    Nausea/Vomiting: no nausea/vomiting    Complications: none    Transfer of care protocol was followedComments: Report to Kendall CLAYTON      Last vitals: p64 r17 sat 99 fm t 36 104/68

## 2024-05-02 NOTE — ANESTHESIA POSTPROCEDURE EVALUATION
Anesthesia Post Evaluation    Patient: Jarocho Martinez    Procedure(s) Performed: Procedure(s) (LRB):  COLONOSCOPY (N/A)    Final Anesthesia Type: general      Patient location during evaluation: GI PACU  Patient participation: Yes- Able to Participate  Level of consciousness: awake and alert  Post-procedure vital signs: reviewed and stable  Pain management: adequate  Airway patency: patent    PONV status at discharge: No PONV  Anesthetic complications: no      Cardiovascular status: hemodynamically stable  Respiratory status: unassisted, spontaneous ventilation and room air  Hydration status: euvolemic  Follow-up not needed.              Vitals Value Taken Time   /83 05/02/24 0737   Temp 36.4 °C (97.5 °F) 05/02/24 0737   Pulse 79 05/02/24 0737   Resp 19 05/02/24 0737   SpO2 100 % 05/02/24 0835         No case tracking events are documented in the log.      Pain/Sherri Score: No data recorded

## 2024-05-02 NOTE — H&P
Colonoscopy History and Physical    Patient Name: Jarocho Martinez  MRN: [id number]  : 1970  Date of Procedure:  2024  Referring Physician: Antonio Soto MD  Primary Physician: Ludy Kline FNP  Procedure Physician: Shannon Dang MD, MPH     Procedure - Colonoscopy  ASA - per anesthesia  Mallampati - per anesthesia  History of Anesthesia problems - no  Family history Anesthesia problems -  no   Plan of anesthesia - General    Diagnosis: screening for colon cancer  Chief Complaint: Same as above    HPI: Patient is an 53 y.o. male is here for the above.     Last colonoscopy: none   Family history: no  Anticoagulation: no    ROS:  Constitutional: No fevers, chills, No weight loss  CV: No chest pain  Pulm: No cough, No shortness of breath  GI: see HPI    Medical History:   No past medical history on file.      Surgical History:   Past Surgical History:   Procedure Laterality Date    APPENDECTOMY      CHOLECYSTECTOMY         Family History:   Family History   Problem Relation Name Age of Onset    Hypertension Mother Jami Martinez     Diabetes Mother Jami Martinez     Heart disease Father Louis Nichols    .    Social History:   Social History     Socioeconomic History    Marital status:    Occupational History    Occupation: construction   Tobacco Use    Smoking status: Never    Smokeless tobacco: Never   Substance and Sexual Activity    Alcohol use: Yes     Alcohol/week: 21.0 standard drinks of alcohol     Types: 21 Cans of beer per week    Drug use: Never    Sexual activity: Yes     Birth control/protection: Condom     Social Determinants of Health     Financial Resource Strain: Low Risk  (3/14/2024)    Overall Financial Resource Strain (CARDIA)     Difficulty of Paying Living Expenses: Not hard at all   Food Insecurity: No Food Insecurity (3/14/2024)    Hunger Vital Sign     Worried About Running Out of Food in the Last Year: Never true     Ran Out of Food in the Last Year: Never  "true   Transportation Needs: No Transportation Needs (3/14/2024)    PRAPARE - Transportation     Lack of Transportation (Medical): No     Lack of Transportation (Non-Medical): No   Recent Concern: Transportation Needs - Unmet Transportation Needs (3/14/2024)    PRAPARE - Transportation     Lack of Transportation (Medical): Yes     Lack of Transportation (Non-Medical): Yes   Physical Activity: Inactive (3/14/2024)    Exercise Vital Sign     Days of Exercise per Week: 0 days     Minutes of Exercise per Session: 0 min   Stress: No Stress Concern Present (3/14/2024)    [facility] of Occupational Health - Occupational Stress Questionnaire     Feeling of Stress : Not at all   Housing Stability: Low Risk  (3/14/2024)    Housing Stability Vital Sign     Unable to Pay for Housing in the Last Year: No     Number of Places Lived in the Last Year: 1     Unstable Housing in the Last Year: No       Review of patient's allergies indicates:  No Known Allergies    Medications:   Medications Prior to Admission   Medication Sig Dispense Refill Last Dose    docusate sodium (COLACE) 100 MG capsule Take 1 capsule (100 mg total) by mouth 2 (two) times daily as needed for Constipation. 20 capsule 0 Taking    polyethylene glycol (MOVIPREP) 100-7.5-2.691 gram solution Take as directed prior to colonoscopy 1 kit 0 Taking         Physical Exam:    Vital Signs:   Vitals:    05/02/24 0737   BP: (!) 143/83   Pulse: 79   Resp: 19   Temp: 97.5 °F (36.4 °C)     BP (!) 143/83   Pulse 79   Temp 97.5 °F (36.4 °C)   Resp 19   Ht 5' 10" (1.778 m)   Wt 90.6 kg (199 lb 12.8 oz)   SpO2 97%   BMI 28.67 kg/m²     General:          Well appearing in no acute distress  Lungs: Clear to auscultation bilaterally, respirations unlabored  Heart: Regular rate and rhythm, S1 and S2 normal, no obvious murmurs  Abdomen:         Soft, non-tender, bowel sounds normal, no masses, no organomegaly        Labs:  Lab Results   Component Value Date    WBC 7.27 " "04/11/2024    HGB 14.6 04/11/2024    HCT 45.1 04/11/2024    MCV 83.4 04/11/2024     04/11/2024     No results found for: "INR", "PT", "APTT"  Lab Results   Component Value Date     04/11/2024    K 5.2 (H) 04/11/2024    CO2 26 04/11/2024    BUN 17.3 04/11/2024    CREATININE 0.85 04/11/2024    LABPROT 7.4 04/11/2024    ALBUMIN 4.3 04/11/2024    BILITOT 0.2 04/11/2024    ALKPHOS 107 04/11/2024    ALT 35 04/11/2024    AST 18 04/11/2024         Assessment and Plan:    History reviewed, vital signs satisfactory, cardiopulmonary status satisfactory.  I have explained the sedation options, risks, benefits, and alternatives of this endoscopic procedure to the patient including but not limited to bleeding, inflammation, infection, perforation, and death.  All questions were answered and the patient consented to proceed with procedure as planned.   The patient is deemed an appropriate candidate for the sedation as planned.      Antonio Soto MD, MPH   of Clinical Medicine  Gastroenterology and Hepatology  [facility]    5/2/2024  8:20 AM     "

## 2024-05-02 NOTE — DISCHARGE SUMMARY
[facility] Endoscopy  Discharge Note  Short Stay    Procedure(s) (LRB):  COLONOSCOPY (N/A)  The Procedure of colonoscopy was explained to the patient and consent obtained.  This was done using  services.  The patient was transferred to the endoscopy suite, general IV anesthesia was provided by anesthesia Services.  Rectal exam was performed and normal.  The Olympus videocolonoscope was introduced per rectum and advanced around the colon to the cecum.  The ileocecal valve and appendiceal orifice were identified and normal, the cecal mucosa was carefully examined and noted to be normal.  The distal terminal ileum inspected and noted to be normal.  The ascending colon was normal proximally but distally a 2 mm sessile polyp was identified and removed with cold snare.  The transverse, descending and sigmoid colon were normal other than a few scattered varying size diverticula in the sigmoid colon.  The rectum was normal including retroflexed view.  The endoscope was withdrawn, withdrawal time cecum to rectum 17 minutes.  The procedure was well tolerated and the patient returned to the recovery area for observation.      Discharge plan patient can resume his regular diet today and normal activities tomorrow.  A follow-up colonoscopy in 5 years is recommended.  OUTCOME: Patient tolerated treatment/procedure well without complication and is now ready for discharge.    DISPOSITION: Home or Self Care    FINAL DIAGNOSIS:  <principal problem not specified>    FOLLOWUP: In clinic    DISCHARGE INSTRUCTIONS:    Discharge Procedure Orders   Diet general     Call MD for:  temperature >100.4     Call MD for:  persistent nausea and vomiting     Call MD for:  severe uncontrolled pain     Call MD for:  difficulty breathing, headache or visual disturbances     Activity as tolerated         Clinical Reference Documents Added to Patient Instructions         Document    COLONOSCOPY DISCHARGE INSTRUCTIONS (Jamaican)             TIME SPENT ON DISCHARGE: 5 minutes

## 2024-05-06 LAB
ESTROGEN SERPL-MCNC: NORMAL PG/ML
INSULIN SERPL-ACNC: NORMAL U[IU]/ML
LAB AP CLINICAL INFORMATION: NORMAL
LAB AP GROSS DESCRIPTION: NORMAL
LAB AP REPORT FOOTNOTES: NORMAL
T3RU NFR SERPL: NORMAL %

## 2024-05-09 ENCOUNTER — TELEPHONE (OUTPATIENT)
Dept: ENDOSCOPY | Facility: HOSPITAL | Age: 54
End: 2024-05-09

## 2024-05-09 ENCOUNTER — HOSPITAL ENCOUNTER (OUTPATIENT)
Dept: RADIOLOGY | Facility: HOSPITAL | Age: 54
Discharge: HOME | End: 2024-05-09
Attending: NURSE PRACTITIONER

## 2024-05-09 ENCOUNTER — PATIENT MESSAGE (OUTPATIENT)
Dept: INTERNAL MEDICINE | Facility: CLINIC | Age: 54
End: 2024-05-09

## 2024-05-09 DIAGNOSIS — N43.2 OTHER HYDROCELE: Primary | ICD-10-CM

## 2024-05-09 DIAGNOSIS — R10.32 LEFT LOWER QUADRANT ABDOMINAL PAIN: ICD-10-CM

## 2024-05-09 DIAGNOSIS — N50.82 SCROTAL PAIN: ICD-10-CM

## 2024-05-09 PROCEDURE — 76870 US EXAM SCROTUM: CPT | Mod: TC

## 2024-05-09 PROCEDURE — 76700 US EXAM ABDOM COMPLETE: CPT | Mod: TC

## 2024-05-09 NOTE — TELEPHONE ENCOUNTER
Attempted to contact patient via  [phone] for colonoscopy results -no answer -left message for return call

## 2024-05-09 NOTE — TELEPHONE ENCOUNTER
----- Message from Antonio Soto MD sent at 5/9/2024  7:02 AM CDT -----  Please let patient know that polyps removed were adenoma polyps.   These types of polyps are not cancer, but they are pre-cancerous (meaning that they can turn into cancers). Removing the polyp removes the risk of it becoming cancer.  Repeat colonoscopy is recommended in 5 years.

## 2024-05-28 ENCOUNTER — OFFICE VISIT (OUTPATIENT)
Dept: INTERNAL MEDICINE | Facility: CLINIC | Age: 54
End: 2024-05-28

## 2024-05-28 ENCOUNTER — HOSPITAL ENCOUNTER (OUTPATIENT)
Dept: RADIOLOGY | Facility: HOSPITAL | Age: 54
Discharge: HOME | End: 2024-05-28
Attending: NURSE PRACTITIONER

## 2024-05-28 VITALS
DIASTOLIC BLOOD PRESSURE: 77 MMHG | TEMPERATURE: 98 F | HEART RATE: 69 BPM | BODY MASS INDEX: 29.46 KG/M2 | RESPIRATION RATE: 16 BRPM | HEIGHT: 70 IN | SYSTOLIC BLOOD PRESSURE: 124 MMHG | WEIGHT: 205.81 LBS

## 2024-05-28 DIAGNOSIS — R10.32 LEFT LOWER QUADRANT ABDOMINAL PAIN: Primary | ICD-10-CM

## 2024-05-28 DIAGNOSIS — G89.29 CHRONIC MIDLINE LOW BACK PAIN WITHOUT SCIATICA: ICD-10-CM

## 2024-05-28 DIAGNOSIS — M54.50 CHRONIC MIDLINE LOW BACK PAIN WITHOUT SCIATICA: ICD-10-CM

## 2024-05-28 DIAGNOSIS — M51.36 LUMBAR DEGENERATIVE DISC DISEASE: ICD-10-CM

## 2024-05-28 PROCEDURE — 99214 OFFICE O/P EST MOD 30 MIN: CPT | Mod: PBBFAC | Performed by: NURSE PRACTITIONER

## 2024-05-28 PROCEDURE — 99214 OFFICE O/P EST MOD 30 MIN: CPT | Mod: S$PBB,,, | Performed by: NURSE PRACTITIONER

## 2024-05-28 PROCEDURE — 72100 X-RAY EXAM L-S SPINE 2/3 VWS: CPT | Mod: TC

## 2024-05-28 RX ORDER — CYCLOBENZAPRINE HCL 10 MG
10 TABLET ORAL 3 TIMES DAILY PRN
Qty: 30 TABLET | Refills: 5 | Status: SHIPPED | OUTPATIENT
Start: 2024-05-28 | End: 2024-11-24

## 2024-05-28 RX ORDER — IBUPROFEN 800 MG/1
800 TABLET ORAL 3 TIMES DAILY PRN
Qty: 60 TABLET | Refills: 3 | Status: SHIPPED | OUTPATIENT
Start: 2024-05-28 | End: 2024-11-24

## 2024-05-28 NOTE — ASSESSMENT & PLAN NOTE
Lumbar X-ray today  Perform back stretches daily.   Avoid activities than increase back pain or stiffness.   Apply heating pad, ice pack, and Icy Hot / BioFreeze as needed; alternate every 15-20 minutes.   Massage back to loosen muscles.

## 2024-05-28 NOTE — PROGRESS NOTES
LIDIA Estes   [facility]  [address] [facility] [address]      PATIENT NAME: Jarocho Martinez  : 1970  DATE: 24  MRN: [id number]      Patient PCP Information       Provider PCP Type    LIDIA Estes General            Reason for Visit / Chief Complaint: Health Maintenance       History of Present Illness / Problem Focused Workflow     Jarocho Martinez presents to the clinic with Health Maintenance     52 yo , English speaking male here to establish care.     Prostate Cancer Screening - 24 PSA 0.56  Colon Cancer Screening -  24 Colonoscopy, 5 yr f/u   Osteoporosis Screening - 24 Vitamin D level 23  STI Screening -   Wellness Screening - 24  Pt here today to establish care, pt is agreeable to routine wellness labs today and we will f/u tomorrow via virtual for lab review. Pt is agreeable to FIT test today for Colon Cancer Screening. Will hold on vaccines due to finances. The pt was instructed to go to [place] [id number] today to apply for financial care however he reports that he is not, is okay with paying OOP for services. 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 Will order soft tissue US and will f/u with results when available and then refer to Gen Surgery for eval. Denies any acute issues today.     24  Pt here today for f/u for lab review;   US Soft tissue is scheduled for next week, will f/u when results are available. Pt reports with pain in his LLQ that radiates to his back x 3 weeks: the pt went to ER in [place] on 24  for chest pain; completed exams include labs, EKG, Chest X-ray, and limited Abdomen US; will request records today to evaluate. The pt reports his pain is constant, feels worse at  times; he also reports with some scrotal / testicle pain that comes and goes as well; pt reports he was dx with GERD in [place] but has not started any medications that were recommended to him. discussed with pt US orders, pt is requesting they be completed today, discussed with pt calling scheduling to see soonest appointment times but unsure if they can be completed today. The pt reports he would rather go to the ED today for evaluation as he is very concerned with his pain. Pt escorted to ED via wheelchair by nurse, report given to Bry in ED. Will f/u with eval and make f/u appt for patient.     05/28/24  Pt here today for f/u appt. Discussed with patient scrotal US results, discussed with pt referral to Urology.  Number given to clinic to call to follow up. Discussed with pt normal abdomen US as well. Pt recently completed colonoscopy on 05/02/24 w/ removal of a polyp and recs for 5 year f/u. The pt reports with continued generalized LLQ pain that comes and goes, feels good today, agreeable to referral to GI for eval, pt w/o insurance at this time for CT. Pt also reports with lower back pain that has been occurring for a while now, denies any injury or trauma, agreeable to lumbar x-ray today,will f/u with results. Denies any other issues today. Will f/u for routine wellness in about 10 months and prn.           Review of Systems     Review of Systems   Constitutional: Negative.    HENT: Negative.     Eyes: Negative.    Respiratory: Negative.     Cardiovascular: Negative.    Gastrointestinal: Negative.    Endocrine: Negative.    Genitourinary: Negative.    Musculoskeletal:  Positive for back pain.   Neurological: Negative.    Psychiatric/Behavioral: Negative.           Medications and Allergies     Medications  Current Outpatient Medications   Medication Instructions    docusate sodium (COLACE) 100 mg, Oral, 2 times daily PRN         Allergies  Review of patient's allergies indicates:  No Known  "Allergies    Physical Examination     Visit Vitals  /77   Pulse 69   Temp 98 °F (36.7 °C)   Resp 16   Ht 5' 10" (1.778 m)   Wt 93.4 kg (205 lb 12.8 oz)   BMI 29.53 kg/m²       Physical Exam  Vitals reviewed.   Constitutional:       Appearance: Normal appearance. He is normal weight.   HENT:      Head: Normocephalic.   Cardiovascular:      Rate and Rhythm: Normal rate and regular rhythm.      Pulses: Normal pulses.      Heart sounds: Normal heart sounds.   Pulmonary:      Effort: Pulmonary effort is normal.      Breath sounds: Normal breath sounds.   Abdominal:      General: Abdomen is flat.      Palpations: Abdomen is soft.   Musculoskeletal:         General: Normal range of motion.      Cervical back: Normal range of motion.   Skin:     General: Skin is warm and dry.   Neurological:      Mental Status: He is alert.   Psychiatric:         Mood and Affect: Mood normal.           Results     Lab Results   Component Value Date    WBC 7.27 04/11/2024    RBC 5.41 04/11/2024    HGB 14.6 04/11/2024    HCT 45.1 04/11/2024    MCV 83.4 04/11/2024    MCH 27.0 04/11/2024    MCHC 32.4 (L) 04/11/2024    RDW 13.2 04/11/2024     04/11/2024    MPV 9.2 04/11/2024     CMP  Sodium   Date Value Ref Range Status   04/11/2024 140 136 - 145 mmol/L Final     Potassium   Date Value Ref Range Status   04/11/2024 5.2 (H) 3.5 - 5.1 mmol/L Final     CO2   Date Value Ref Range Status   04/11/2024 26 22 - 29 mmol/L Final     Blood Urea Nitrogen   Date Value Ref Range Status   04/11/2024 17.3 8.4 - 25.7 mg/dL Final     Creatinine   Date Value Ref Range Status   04/11/2024 0.85 0.73 - 1.18 mg/dL Final     Calcium   Date Value Ref Range Status   04/11/2024 10.0 8.4 - 10.2 mg/dL Final     Albumin   Date Value Ref Range Status   04/11/2024 4.3 3.5 - 5.0 g/dL Final     Bilirubin Total   Date Value Ref Range Status   04/11/2024 0.2 <=1.5 mg/dL Final     ALP   Date Value Ref Range Status   04/11/2024 107 40 - 150 unit/L Final     AST   Date " Value Ref Range Status   04/11/2024 18 5 - 34 unit/L Final     ALT   Date Value Ref Range Status   04/11/2024 35 0 - 55 unit/L Final     Estimated GFR-Non    Date Value Ref Range Status   11/29/2018 96 >>=90 mL/min Final     Lab Results   Component Value Date    CHOL 202 (H) 03/15/2024     Lab Results   Component Value Date    HDL 52 03/15/2024     Lab Results   Component Value Date    TRIG 122 03/15/2024     Lab Results   Component Value Date    VLDL 24 03/15/2024     Lab Results   Component Value Date    .00 03/15/2024     Lab Results   Component Value Date    TSH 1.170 11/29/2018         Assessment        ICD-10-CM ICD-9-CM   1. Left lower quadrant abdominal pain  R10.32 789.04   2. Chronic midline low back pain without sciatica  M54.50 724.2    G89.29 338.29        Plan      Problem List Items Addressed This Visit          GI    Left lower quadrant abdominal pain - Primary    Current Assessment & Plan     Referral to [facility] GI  Colonoscopy completed  Continue colace prn            Relevant Orders    Ambulatory referral/consult to Gastroenterology       Orthopedic    Chronic midline low back pain without sciatica    Current Assessment & Plan     Lumbar X-ray today  Perform back stretches daily.   Avoid activities than increase back pain or stiffness.   Apply heating pad, ice pack, and Icy Hot / BioFreeze as needed; alternate every 15-20 minutes.   Massage back to loosen muscles.            Relevant Orders    X-Ray Lumbar Spine 2 Or 3 Views       Future Appointments   Date Time Provider Department Center   4/1/2025  9:40 AM Ludy Kline, L[facility]        Follow up in about 44 weeks (around 4/1/2025) for Wellness.      Signature:     [facility]  [address] [facility] [address]    Date of encounter: 5/28/24

## 2024-05-28 NOTE — PROGRESS NOTES
Please contact patient and inform him that I  have reviewed his back x-ray and it shows some signs of degenerative changes related to arthritis. I will send the patient a medication to take as needed for his back pain. I would also advise stretching and exercises to help with lower back pain as well. Advise if he does end up obtaining insurance, he can let me know and we can refer him to Physical Therapy.     Thanks,    LIDIA Centeno

## 2024-05-29 ENCOUNTER — TELEPHONE (OUTPATIENT)
Dept: INTERNAL MEDICINE | Facility: CLINIC | Age: 54
End: 2024-05-29

## 2024-05-29 NOTE — TELEPHONE ENCOUNTER
----- Message from LIDIA Estes sent at 5/28/2024  8:30 AM CDT -----  Please contact patient and inform him that I  have reviewed his back x-ray and it shows some signs of degenerative changes related to arthritis. I will send the patient a medication to take as needed for his back pain. I would also advise stretching and exercises to help with lower back pain as well. Advise if he does end up obtaining insurance, he can let me know and we can refer him to Physical Therapy.     Thanks,    LIDIA Centeno

## 2024-05-29 NOTE — TELEPHONE ENCOUNTER
I contacted patient and informed provider LIDIA Calderon reviewed x ray with findings of degenerative changes. New order medication sent to pharmacy. Exercise and stretch back . PT can be option if insurance is obtained. Patient voiced understanding.

## 2024-06-17 PROBLEM — Z00.00 WELLNESS EXAMINATION: Status: RESOLVED | Noted: 2024-03-14 | Resolved: 2024-06-17

## 2024-12-07 ENCOUNTER — HOSPITAL ENCOUNTER (EMERGENCY)
Facility: HOSPITAL | Age: 54
Discharge: HOME | End: 2024-12-07
Attending: EMERGENCY MEDICINE

## 2024-12-07 VITALS
HEIGHT: 70 IN | SYSTOLIC BLOOD PRESSURE: 107 MMHG | BODY MASS INDEX: 25.25 KG/M2 | WEIGHT: 176.38 LBS | RESPIRATION RATE: 12 BRPM | TEMPERATURE: 98 F | HEART RATE: 72 BPM | DIASTOLIC BLOOD PRESSURE: 63 MMHG | OXYGEN SATURATION: 99 %

## 2024-12-07 DIAGNOSIS — K29.70 GASTRITIS, PRESENCE OF BLEEDING UNSPECIFIED, UNSPECIFIED CHRONICITY, UNSPECIFIED GASTRITIS TYPE: Primary | ICD-10-CM

## 2024-12-07 DIAGNOSIS — R07.9 CHEST PAIN: ICD-10-CM

## 2024-12-07 DIAGNOSIS — Z13.9 SCREENING DUE: ICD-10-CM

## 2024-12-07 LAB
ALBUMIN SERPL-MCNC: 4.1 G/DL (ref 3.5–5)
ALBUMIN/GLOB SERPL: 1.4 RATIO (ref 1.1–2)
ALP SERPL-CCNC: 84 UNIT/L (ref 40–150)
ALT SERPL-CCNC: 26 UNIT/L (ref 0–55)
ANION GAP SERPL CALC-SCNC: 8 MEQ/L
AST SERPL-CCNC: 17 UNIT/L (ref 5–34)
BASOPHILS # BLD AUTO: 0.01 X10(3)/MCL
BASOPHILS NFR BLD AUTO: 0.1 %
BILIRUB SERPL-MCNC: 0.3 MG/DL
BUN SERPL-MCNC: 15.8 MG/DL (ref 8.4–25.7)
CALCIUM SERPL-MCNC: 9.3 MG/DL (ref 8.4–10.2)
CHLORIDE SERPL-SCNC: 108 MMOL/L (ref 98–107)
CO2 SERPL-SCNC: 23 MMOL/L (ref 22–29)
CREAT SERPL-MCNC: 0.84 MG/DL (ref 0.72–1.25)
CREAT/UREA NIT SERPL: 19
EOSINOPHIL # BLD AUTO: 0.06 X10(3)/MCL (ref 0–0.9)
EOSINOPHIL NFR BLD AUTO: 0.9 %
ERYTHROCYTE [DISTWIDTH] IN BLOOD BY AUTOMATED COUNT: 13.2 % (ref 11.5–17)
GFR SERPLBLD CREATININE-BSD FMLA CKD-EPI: >60 ML/MIN/1.73/M2
GLOBULIN SER-MCNC: 2.9 GM/DL (ref 2.4–3.5)
GLUCOSE SERPL-MCNC: 101 MG/DL (ref 74–100)
HCT VFR BLD AUTO: 42.2 % (ref 42–52)
HGB BLD-MCNC: 13.9 G/DL (ref 14–18)
HOLD SPECIMEN: NORMAL
IMM GRANULOCYTES # BLD AUTO: 0.01 X10(3)/MCL (ref 0–0.04)
IMM GRANULOCYTES NFR BLD AUTO: 0.1 %
LIPASE SERPL-CCNC: 29 U/L
LYMPHOCYTES # BLD AUTO: 2.2 X10(3)/MCL (ref 0.6–4.6)
LYMPHOCYTES NFR BLD AUTO: 32.4 %
MCH RBC QN AUTO: 26.5 PG (ref 27–31)
MCHC RBC AUTO-ENTMCNC: 32.9 G/DL (ref 33–36)
MCV RBC AUTO: 80.5 FL (ref 80–94)
MONOCYTES # BLD AUTO: 0.57 X10(3)/MCL (ref 0.1–1.3)
MONOCYTES NFR BLD AUTO: 8.4 %
NEUTROPHILS # BLD AUTO: 3.93 X10(3)/MCL (ref 2.1–9.2)
NEUTROPHILS NFR BLD AUTO: 58.1 %
NRBC BLD AUTO-RTO: 0 %
PLATELET # BLD AUTO: 262 X10(3)/MCL (ref 130–400)
PMV BLD AUTO: 9.2 FL (ref 7.4–10.4)
POTASSIUM SERPL-SCNC: 4 MMOL/L (ref 3.5–5.1)
PROT SERPL-MCNC: 7 GM/DL (ref 6.4–8.3)
RBC # BLD AUTO: 5.24 X10(6)/MCL (ref 4.7–6.1)
SODIUM SERPL-SCNC: 139 MMOL/L (ref 136–145)
TROPONIN I SERPL-MCNC: <0.01 NG/ML (ref 0–0.04)
WBC # BLD AUTO: 6.78 X10(3)/MCL (ref 4.5–11.5)

## 2024-12-07 PROCEDURE — 85025 COMPLETE CBC W/AUTO DIFF WBC: CPT | Performed by: EMERGENCY MEDICINE

## 2024-12-07 PROCEDURE — 83690 ASSAY OF LIPASE: CPT | Performed by: EMERGENCY MEDICINE

## 2024-12-07 PROCEDURE — 25000003 PHARM REV CODE 250: Performed by: EMERGENCY MEDICINE

## 2024-12-07 PROCEDURE — 84484 ASSAY OF TROPONIN QUANT: CPT | Performed by: EMERGENCY MEDICINE

## 2024-12-07 PROCEDURE — 36415 COLL VENOUS BLD VENIPUNCTURE: CPT | Performed by: EMERGENCY MEDICINE

## 2024-12-07 PROCEDURE — 80053 COMPREHEN METABOLIC PANEL: CPT | Performed by: EMERGENCY MEDICINE

## 2024-12-07 PROCEDURE — 99285 EMERGENCY DEPT VISIT HI MDM: CPT | Mod: 25

## 2024-12-07 PROCEDURE — 93005 ELECTROCARDIOGRAM TRACING: CPT

## 2024-12-07 RX ORDER — LIDOCAINE HYDROCHLORIDE 20 MG/ML
5 SOLUTION OROPHARYNGEAL
Status: COMPLETED | OUTPATIENT
Start: 2024-12-07 | End: 2024-12-07

## 2024-12-07 RX ORDER — FAMOTIDINE 20 MG/1
20 TABLET, FILM COATED ORAL 2 TIMES DAILY
Qty: 20 TABLET | Refills: 0 | Status: SHIPPED | OUTPATIENT
Start: 2024-12-07 | End: 2024-12-17

## 2024-12-07 RX ORDER — FAMOTIDINE 20 MG/1
20 TABLET, FILM COATED ORAL
Status: COMPLETED | OUTPATIENT
Start: 2024-12-07 | End: 2024-12-07

## 2024-12-07 RX ADMIN — ALUMINUM HYDROXIDE AND MAGNESIUM HYDROXIDE 30 ML: 200; 200 SUSPENSION ORAL at 02:12

## 2024-12-07 RX ADMIN — LIDOCAINE HYDROCHLORIDE 5 ML: 20 SOLUTION ORAL at 02:12

## 2024-12-07 RX ADMIN — FAMOTIDINE 20 MG: 20 TABLET, FILM COATED ORAL at 02:12

## 2024-12-07 NOTE — ED PROVIDER NOTES
Encounter Date: 12/7/2024       History     Chief Complaint   Patient presents with    Chest Pain     4-5 wks of intermittent chest pain, headaches, and variable home BP readings. EKG obtained upon arrival to ED.     Patient is here with 4 weeks of epigastric abdominal pain, previously diagnosed as gastritis, intermittently improves with Maalox though sometimes not responsive to Maalox.        Review of patient's allergies indicates:  No Known Allergies  History reviewed. No pertinent past medical history.  Past Surgical History:   Procedure Laterality Date    APPENDECTOMY      CHOLECYSTECTOMY      COLONOSCOPY, WITH POLYPECTOMY USING SNARE N/A 5/2/2024    Procedure: COLONOSCOPY, WITH POLYPECTOMY USING SNARE;  Surgeon: Antonio Soto MD;  Location: [facility] ENDOSCOPY;  Service: Endoscopy;  Laterality: N/A;     Family History   Problem Relation Name Age of Onset    Hypertension Mother Jami Martinez     Diabetes Mother Jami Martinez     Heart disease Father Louis Nichols      Social History     Tobacco Use    Smoking status: Never    Smokeless tobacco: Never   Substance Use Topics    Alcohol use: Not Currently     Alcohol/week: 21.0 standard drinks of alcohol     Types: 21 Cans of beer per week    Drug use: Never     Review of Systems    Physical Exam     Initial Vitals [12/07/24 1350]   BP Pulse Resp Temp SpO2   118/70 84 16 98.1 °F (36.7 °C) 97 %      MAP       --         Physical Exam    Nursing note and vitals reviewed.  Constitutional: He appears well-developed and well-nourished. He is not diaphoretic. No distress.   HENT:   Head: Normocephalic and atraumatic.   Eyes: EOM are normal. Pupils are equal, round, and reactive to light. Right eye exhibits no discharge. Left eye exhibits no discharge.   Neck: Neck supple. No thyromegaly present. No tracheal deviation present. No JVD present.   Normal range of motion.  Cardiovascular:  Normal rate, regular rhythm, normal heart sounds and intact distal pulses.            No murmur heard.  Pulmonary/Chest: Breath sounds normal. No stridor. No respiratory distress. He has no wheezes. He has no rhonchi. He has no rales.   Abdominal: Abdomen is soft. He exhibits no distension. There is no abdominal tenderness. There is no rebound and no guarding.   Musculoskeletal:         General: No tenderness or edema. Normal range of motion.      Cervical back: Normal range of motion and neck supple.     Neurological: He is alert and oriented to person, place, and time. He has normal strength. No cranial nerve deficit. GCS score is 15. GCS eye subscore is 4. GCS verbal subscore is 5. GCS motor subscore is 6.   Skin: Skin is warm and dry. Capillary refill takes less than 2 seconds. No rash and no abscess noted. No erythema. No pallor.   Psychiatric: He has a normal mood and affect. His behavior is normal. Judgment and thought content normal.         ED Course   Procedures  Labs Reviewed   COMPREHENSIVE METABOLIC PANEL - Abnormal       Result Value    Sodium 139      Potassium 4.0      Chloride 108 (*)     CO2 23      Glucose 101 (*)     Blood Urea Nitrogen 15.8      Creatinine 0.84      Calcium 9.3      Protein Total 7.0      Albumin 4.1      Globulin 2.9      Albumin/Globulin Ratio 1.4      Bilirubin Total 0.3      ALP 84      ALT 26      AST 17      eGFR >60      Anion Gap 8.0      BUN/Creatinine Ratio 19     CBC WITH DIFFERENTIAL - Abnormal    WBC 6.78      RBC 5.24      Hgb 13.9 (*)     Hct 42.2      MCV 80.5      MCH 26.5 (*)     MCHC 32.9 (*)     RDW 13.2      Platelet 262      MPV 9.2      Neut % 58.1      Lymph % 32.4      Mono % 8.4      Eos % 0.9      Basophil % 0.1      Lymph # 2.20      Neut # 3.93      Mono # 0.57      Eos # 0.06      Baso # 0.01      IG# 0.01      IG% 0.1      NRBC% 0.0     LIPASE - Normal    Lipase Level 29     TROPONIN I - Normal    Troponin-I <0.010     CBC W/ AUTO DIFFERENTIAL    Narrative:     The following orders were created for panel order CBC auto  differential.  Procedure                               Abnormality         Status                     ---------                               -----------         ------                     CBC with Differential[[id number]]       Abnormal            Final result                 Please view results for these tests on the individual orders.   EXTRA TUBES    Narrative:     The following orders were created for panel order EXTRA TUBES.  Procedure                               Abnormality         Status                     ---------                               -----------         ------                     Light Blue Top Hold[[id number]]                             In process                 Lavender Top Hold[[id number]]                               In process                 Gold Top Hold[[id number]]                                   In process                 Pink Top Hold[[id number]]                                   In process                   Please view results for these tests on the individual orders.   LIGHT BLUE TOP HOLD   LAVENDER TOP HOLD   GOLD TOP HOLD   PINK TOP HOLD     EKG Readings: (Independently Interpreted)   Sinus rhythm at 85, nonacute and nonischemic appearing;     ECG Results              EKG 12-lead (Chest Pain) Age >30 (In process)        Collection Time Result Time QRS Duration [facility] QTC Calculation    12/07/24 13:39:02 12/07/24 13:48:52 78 411                     In process by Interface, Lab In [facility] (12/07/24 13:49:01)                   Narrative:    Test Reason : R07.9,    Vent. Rate :  85 BPM     Atrial Rate :  85 BPM     P-R Int : 174 ms          QRS Dur :  78 ms      QT Int : 346 ms       P-R-T Axes :  47  45  38 degrees    QTcB Int : 411 ms    Normal sinus rhythm  Normal ECG  When compared with ECG of 11-Apr-2024 07:56,  No significant change was found    Referred By:            Confirmed By:                                   Imaging Results              X-Ray Chest AP Portable (Final  result)  Result time 12/07/24 14:55:57      Final result by Camilo Prieto MD (12/07/24 14:55:57)                   Impression:      No acute cardiopulmonary abnormality.      Electronically signed by: Camilo Prieto MD  Date:    12/07/2024  Time:    14:55               Narrative:    EXAMINATION:  Single view chest radiograph.    CLINICAL HISTORY:  Encounter for screening, unspecified    TECHNIQUE:  Single view of the chest.    COMPARISON:  None.    FINDINGS:  The lungs are clear without consolidation or effusion.  There is no pneumothorax.  The cardiac silhouette is normal in size.  There is no acute osseous abnormality.                                    X-Rays:   Independently Interpreted Readings:   Other Readings:  Chest x-ray without acute abnormal findings;    Medications   aluminum-magnesium hydroxide 200-200 mg/5 mL suspension 30 mL (30 mLs Oral Given 12/7/24 1424)   LIDOcaine viscous HCl 2% oral solution 5 mL (5 mLs Oral Given 12/7/24 1424)   famotidine tablet 20 mg (20 mg Oral Given 12/7/24 1424)     Medical Decision Making  Amount and/or Complexity of Data Reviewed  External Data Reviewed: notes.  Labs: ordered. Decision-making details documented in ED Course.     Details: As above;  Radiology: ordered and independent interpretation performed. Decision-making details documented in ED Course.     Details: Chest x-ray without acute abnormal findings;  ECG/medicine tests: ordered and independent interpretation performed. Decision-making details documented in ED Course.     Details: Sinus rhythm at 85, nonacute and nonischemic appearing;    Risk  OTC drugs.  Prescription drug management.  Risk Details: Risk found sufficient to warrant expanded evaluation with objective data.  The data resulted in found reassuring.  Symptoms are improved with conservative interventions.  Patient is discharged home in improved condition with prescriptions, anticipatory guidance, return precautions, follow-up instructions.                ED Course as of 12/07/24 1536   Sat Dec 07, 2024   1500 Reassuring hemogram; [CT]   1502 Reassuring chemistries; [CT]   1502 Normal lipase; [CT]   1534 Negative troponin; [CT]      ED Course User Index  [CT] Otis Simmons MD                             Clinical Impression:  Final diagnoses:  [R07.9] Chest pain  [Z13.9] Screening due  [K29.70] Gastritis, presence of bleeding unspecified, unspecified chronicity, unspecified gastritis type (Primary)          ED Disposition Condition    Discharge Stable          ED Prescriptions       Medication Sig Dispense Start Date End Date Auth. Provider    famotidine (PEPCID) 20 MG tablet Take 1 tablet (20 mg total) by mouth 2 (two) times daily. for 10 days 20 tablet 12/7/2024 12/17/2024 Otis Simmons MD          Follow-up Information       Follow up With Specialties Details Why Contact Info [facility] Emergency Medicine  As needed, If symptoms worsen [address] [facility] [id number] [phone]    Ludy Kline FNMICHELLE Internal Medicine Call   [address] [facility] [address]  [phone]               Otis Simmons MD  12/07/24 1536

## 2024-12-09 LAB
OHS QRS DURATION: 78 MS
OHS QTC CALCULATION: 411 MS

## 2025-05-15 ENCOUNTER — OFFICE VISIT (OUTPATIENT)
Dept: UROLOGY | Facility: CLINIC | Age: 55
End: 2025-05-15

## 2025-05-15 VITALS
HEIGHT: 70 IN | OXYGEN SATURATION: 97 % | BODY MASS INDEX: 25.25 KG/M2 | DIASTOLIC BLOOD PRESSURE: 82 MMHG | SYSTOLIC BLOOD PRESSURE: 126 MMHG | RESPIRATION RATE: 20 BRPM | TEMPERATURE: 98 F | WEIGHT: 176.38 LBS | HEART RATE: 80 BPM

## 2025-05-15 DIAGNOSIS — N43.3 HYDROCELE OF TESTIS: ICD-10-CM

## 2025-05-15 DIAGNOSIS — N43.3 HYDROCELE, UNSPECIFIED HYDROCELE TYPE: Primary | ICD-10-CM

## 2025-05-15 LAB
BILIRUB SERPL-MCNC: NORMAL MG/DL
BLOOD URINE, POC: NORMAL
COLOR, POC UA: YELLOW
GLUCOSE UR QL STRIP: NORMAL
KETONES UR QL STRIP: NORMAL
LEUKOCYTE ESTERASE URINE, POC: NORMAL
NITRITE, POC UA: NORMAL
PH, POC UA: 5.5
PROTEIN, POC: 30
SPECIFIC GRAVITY, POC UA: >=1.03
UROBILINOGEN, POC UA: 0.2

## 2025-05-15 PROCEDURE — 99213 OFFICE O/P EST LOW 20 MIN: CPT | Mod: S$PBB,,, | Performed by: NURSE PRACTITIONER

## 2025-05-15 PROCEDURE — 81001 URINALYSIS AUTO W/SCOPE: CPT | Mod: PBBFAC | Performed by: NURSE PRACTITIONER

## 2025-05-15 PROCEDURE — 99214 OFFICE O/P EST MOD 30 MIN: CPT | Mod: PBBFAC | Performed by: NURSE PRACTITIONER

## 2025-05-15 NOTE — PROGRESS NOTES
Chief Complaint:   Chief Complaint   Patient presents with    Hydrocele     Complaints of testicular pain and lower abdominal pain       HPI:   Patient is a 55-year-old male referred to Urology for testicular pain and lower abdominal pain, hydrocele  Patient seen by PCP on 05/09/2024 with a scrotal ultrasound revealing a small right hydrocele.  Patient's last seen for these symptoms of scrotal pain 1 year ago.  Today patient presents persistent right testicular pain intermittently, today his pain is minimal.  Instructed patient to wear tighter fitting undergarments and we will repeat scrotal ultrasound.  Instructed patient to address testicular pain with alternating Tylenol Advil twice daily.  RTC 3 weeks to discuss ultrasound results.      Allergies:  Review of patient's allergies indicates:  No Known Allergies    Medications:  Current Medications[1]    Review of Systems:  General: No fever, chills, fatigability, or weight loss.  Skin: No rashes, itching, or changes in color or texture of skin.  Chest: Denies FLORES, cyanosis, wheezing, cough, and sputum production.  Abdomen: Appetite fine. No weight loss. Denies diarrhea, abdominal pain, hematemesis, or blood in stool.  Musculoskeletal: No joint stiffness or swelling. Denies back pain.  : As above.  All other review of systems negative.    PMH:  History reviewed. No pertinent past medical history.    PSH:  Past Surgical History:   Procedure Laterality Date    APPENDECTOMY      CHOLECYSTECTOMY      COLONOSCOPY, WITH POLYPECTOMY USING SNARE N/A 5/2/2024    Procedure: COLONOSCOPY, WITH POLYPECTOMY USING SNARE;  Surgeon: Antonio Soto MD;  Location: [facility] ENDOSCOPY;  Service: Endoscopy;  Laterality: N/A;       FamHx:  Family History   Problem Relation Name Age of Onset    Hypertension Mother Jami Martinez     Diabetes Mother Jami Martinez     Heart disease Father Louis Nichols        SocHx:  Social History[2]    Physical Exam:  Vitals:    05/15/25 0956    BP: 126/82   Pulse: 80   Resp: 20   Temp: 98.1 °F (36.7 °C)     General: A&Ox3, no apparent distress, no deformities  Neck: No masses, normal thyroid  Lungs: CTA alexis, no use of accessory muscles  Heart: RRR, no arrhythmias  Abdomen: Soft, NT, ND, no masses, no hernias, no hepatosplenomegaly  Lymphatic: Neck and groin nodes negative  Skin: The skin is warm and dry. No jaundice.  Ext: No c/c/e.    GUMale: Test desc alexis, no abnormalities of epididymus, mild-to-moderate right fluid-filled in scrotal. Penis uncircumcised, with normal penile and scrotal skin. Meatus normal.     Urinalysis:  Results for orders placed or performed in visit on 05/15/25   POCT URINE DIPSTICK WITH MICROSCOPE, AUTOMATED   Result Value Ref Range    Color, UA Yellow     Spec Grav UA >=1.030     pH, UA 5.5     WBC, UA neg     Nitrite, UA neg     Protein, POC 30     Glucose, UA neg     Ketones, UA neg     Urobilinogen, UA 0.2     Bilirubin, POC neg     Blood, UA neg    Microscopic urinalysis revealed negative WBCs, negative nitrites, negative RBCs.      Imaging:  Scrotal ultrasound on 05/09/2024 revealed:  Negative for torsion. Small right hydrocele      Impression:  1. Hydrocele, unspecified hydrocele type  - POCT URINE DIPSTICK WITH MICROSCOPE, AUTOMATED      Plan:  Instructed patient to repeat scrotal ultrasound RTC 3 weeks for re-evaluation.  Instructed patient where form fitting briefs to elevate and cradle scrotum until seen after scrotal ultrasound.    Instructed patient if develops any abnormal urologic symptoms notify clinic to be re-evaluate treated or during after hours go to emergency room versus urgent here.                           [facility]       [1]   Current Outpatient Medications   Medication Sig Dispense Refill    famotidine (PEPCID) 20 MG tablet Take 1 tablet (20 mg total) by mouth 2 (two) times daily. for 10 days 20 tablet 0    docusate sodium (COLACE) 100 MG capsule Take 1 capsule (100 mg total) by mouth 2 (two) times daily as  needed for Constipation. (Patient not taking: Reported on 5/15/2025) 20 capsule 0     No current facility-administered medications for this visit.   [2]   Social History  Socioeconomic History    Marital status:    Occupational History    Occupation: construction   Tobacco Use    Smoking status: Never    Smokeless tobacco: Never   Substance and Sexual Activity    Alcohol use: Not Currently     Alcohol/week: 21.0 standard drinks of alcohol     Types: 21 Cans of beer per week    Drug use: Never    Sexual activity: Yes     Birth control/protection: Condom     Social Drivers of Health     Financial Resource Strain: Low Risk  (3/14/2024)    Overall Financial Resource Strain (CARDIA)     Difficulty of Paying Living Expenses: Not hard at all   Food Insecurity: No Food Insecurity (3/14/2024)    Hunger Vital Sign     Worried About Running Out of Food in the Last Year: Never true     Ran Out of Food in the Last Year: Never true   Transportation Needs: No Transportation Needs (3/14/2024)    PRAPARE - Transportation     Lack of Transportation (Medical): No     Lack of Transportation (Non-Medical): No   Recent Concern: Transportation Needs - Unmet Transportation Needs (3/14/2024)    PRAPARE - Transportation     Lack of Transportation (Medical): Yes     Lack of Transportation (Non-Medical): Yes   Physical Activity: Inactive (3/14/2024)    Exercise Vital Sign     Days of Exercise per Week: 0 days     Minutes of Exercise per Session: 0 min   Stress: No Stress Concern Present (3/14/2024)    [employer] - Occupational Stress Questionnaire     Feeling of Stress : Not at all   Housing Stability: Low Risk  (3/14/2024)    Housing Stability Vital Sign     Unable to Pay for Housing in the Last Year: No     Number of Places Lived in the Last Year: 1     Unstable Housing in the Last Year: No

## 2025-05-20 ENCOUNTER — HOSPITAL ENCOUNTER (OUTPATIENT)
Dept: RADIOLOGY | Facility: HOSPITAL | Age: 55
Discharge: HOME | End: 2025-05-20
Attending: NURSE PRACTITIONER

## 2025-05-20 DIAGNOSIS — N43.3 HYDROCELE, UNSPECIFIED HYDROCELE TYPE: ICD-10-CM

## 2025-05-20 PROCEDURE — 76870 US EXAM SCROTUM: CPT | Mod: TC

## 2025-06-10 ENCOUNTER — OFFICE VISIT (OUTPATIENT)
Dept: UROLOGY | Facility: CLINIC | Age: 55
End: 2025-06-10

## 2025-06-10 VITALS
BODY MASS INDEX: 29.75 KG/M2 | OXYGEN SATURATION: 98 % | WEIGHT: 207.81 LBS | SYSTOLIC BLOOD PRESSURE: 126 MMHG | HEART RATE: 80 BPM | DIASTOLIC BLOOD PRESSURE: 82 MMHG | TEMPERATURE: 98 F | HEIGHT: 70 IN | RESPIRATION RATE: 20 BRPM

## 2025-06-10 DIAGNOSIS — Z12.5 SCREENING PSA (PROSTATE SPECIFIC ANTIGEN): Primary | ICD-10-CM

## 2025-06-10 DIAGNOSIS — N43.3 HYDROCELE, UNSPECIFIED HYDROCELE TYPE: ICD-10-CM

## 2025-06-10 PROCEDURE — 99213 OFFICE O/P EST LOW 20 MIN: CPT | Mod: S$PBB,,, | Performed by: NURSE PRACTITIONER

## 2025-06-10 PROCEDURE — 99214 OFFICE O/P EST MOD 30 MIN: CPT | Mod: PBBFAC | Performed by: NURSE PRACTITIONER

## 2025-06-10 NOTE — PROGRESS NOTES
Chief Complaint:   Chief Complaint   Patient presents with    3 wk f/u    Testicle Pain       HPI:   Patient is a 55-year-old male 3 week referral for testicular pain and lower abdominal pain, hydrocele with a ultrasound  Patient seen by PCP on 05/09/2024 with a scrotal ultrasound revealing a small right hydrocele.  Patient's last seen for these symptoms of scrotal pain 1 year ago.  Today patient presents persistent right testicular pain intermittently, today his pain is minimal.  Instructed patient to wear tighter fitting undergarments and we will repeat scrotal ultrasound.  Instructed patient to address testicular pain with alternating Tylenol Advil twice daily.        Allergies:  Review of patient's allergies indicates:  No Known Allergies    Medications:  Current Medications[1]    Review of Systems:  General: No fever, chills, fatigability, or weight loss.  Skin: No rashes, itching, or changes in color or texture of skin.  Chest: Denies FLORES, cyanosis, wheezing, cough, and sputum production.  Abdomen: Appetite fine. No weight loss. Denies diarrhea, abdominal pain, hematemesis, or blood in stool.  Musculoskeletal: No joint stiffness or swelling. Denies back pain.  : As above.  All other review of systems negative.    PMH:  History reviewed. No pertinent past medical history.    PSH:  Past Surgical History:   Procedure Laterality Date    APPENDECTOMY      CHOLECYSTECTOMY      COLONOSCOPY, WITH POLYPECTOMY USING SNARE N/A 5/2/2024    Procedure: COLONOSCOPY, WITH POLYPECTOMY USING SNARE;  Surgeon: Antonio Soto MD;  Location: [facility] ENDOSCOPY;  Service: Endoscopy;  Laterality: N/A;       FamHx:  Family History   Problem Relation Name Age of Onset    Hypertension Mother Jami Martinez     Diabetes Mother Jami Martinez     Heart disease Father Louis Nichols        SocHx:  Social History[2]    Physical Exam:  Vitals:    06/10/25 1417   BP: 126/82   Pulse: 80   Resp: 20   Temp: 98 °F (36.7 °C)     General:  A&Ox3, no apparent distress, no deformities  Neck: No masses, normal thyroid  Lungs: CTA alexis, no use of accessory muscles  Heart: RRR, no arrhythmias  Abdomen: Soft, NT, ND, no masses, no hernias, no hepatosplenomegaly  Lymphatic: Neck and groin nodes negative  Skin: The skin is warm and dry. No jaundice.  Ext: No c/c/e.        Imaging:  Bladder ultrasound on 05/20/2025 revealed:  Both testicles demonstrate arterial and venous Doppler flow without specific evidence of active testicular torsion at this time.Right hydrocele is noted. Small bilateral epididymal cysts are seen.       Impression:  Epididymal cyst  Hydrocele  Screening PSA    Plan:  Instructed patient to RTC 6 months with screening PSA and scrotal ultrasound.  2.   Instructed patient if develops any abnormal urologic symptoms notify clinic to be re-evaluate treated or during after hours go to emergency room versus urgent here.                           [facility]       [1]   Current Outpatient Medications   Medication Sig Dispense Refill    docusate sodium (COLACE) 100 MG capsule Take 1 capsule (100 mg total) by mouth 2 (two) times daily as needed for Constipation. (Patient not taking: Reported on 6/10/2025) 20 capsule 0    famotidine (PEPCID) 20 MG tablet Take 1 tablet (20 mg total) by mouth 2 (two) times daily. for 10 days 20 tablet 0     No current facility-administered medications for this visit.   [2]   Social History  Socioeconomic History    Marital status:    Occupational History    Occupation: construction   Tobacco Use    Smoking status: Never    Smokeless tobacco: Never   Substance and Sexual Activity    Alcohol use: Not Currently     Alcohol/week: 21.0 standard drinks of alcohol     Types: 21 Cans of beer per week    Drug use: Never    Sexual activity: Yes     Birth control/protection: Condom     Social Drivers of Health     Financial Resource Strain: Low Risk  (3/14/2024)    Overall Financial Resource Strain (CARDIA)     Difficulty of Paying  Living Expenses: Not hard at all   Food Insecurity: No Food Insecurity (3/14/2024)    Hunger Vital Sign     Worried About Running Out of Food in the Last Year: Never true     Ran Out of Food in the Last Year: Never true   Transportation Needs: No Transportation Needs (3/14/2024)    PRAPARE - Transportation     Lack of Transportation (Medical): No     Lack of Transportation (Non-Medical): No   Recent Concern: Transportation Needs - Unmet Transportation Needs (3/14/2024)    PRAPARE - Transportation     Lack of Transportation (Medical): Yes     Lack of Transportation (Non-Medical): Yes   Physical Activity: Inactive (3/14/2024)    Exercise Vital Sign     Days of Exercise per Week: 0 days     Minutes of Exercise per Session: 0 min   Stress: No Stress Concern Present (3/14/2024)    [employer] - Occupational Stress Questionnaire     Feeling of Stress : Not at all   Housing Stability: Low Risk  (3/14/2024)    Housing Stability Vital Sign     Unable to Pay for Housing in the Last Year: No     Number of Places Lived in the Last Year: 1     Unstable Housing in the Last Year: No

## 2025-06-10 NOTE — PROGRESS NOTES
Pt seen by LUKE Pairkh; Pt instructed to return to clinic in 6 months w/scrotal US & PSA; Discharge paperwork given w/pt verbalizing understanding

## (undated) DEVICE — SOL IRRI STRL WATER 1000ML

## (undated) DEVICE — TIP SUCTION YANKAUER

## (undated) DEVICE — KIT SURGICAL COLON .25 1.1OZ

## (undated) DEVICE — MANIFOLD 4 PORT

## (undated) DEVICE — TRAP ETRAP POLYP 50 TRAY

## (undated) DEVICE — SNARE EXACTO COLD